# Patient Record
Sex: FEMALE | Race: WHITE | NOT HISPANIC OR LATINO | Employment: OTHER | URBAN - METROPOLITAN AREA
[De-identification: names, ages, dates, MRNs, and addresses within clinical notes are randomized per-mention and may not be internally consistent; named-entity substitution may affect disease eponyms.]

---

## 2019-03-18 ENCOUNTER — OFFICE VISIT (OUTPATIENT)
Dept: INTERNAL MEDICINE | Facility: CLINIC | Age: 69
End: 2019-03-18
Payer: MEDICARE

## 2019-03-18 VITALS
OXYGEN SATURATION: 96 % | HEART RATE: 66 BPM | WEIGHT: 168 LBS | HEIGHT: 68 IN | TEMPERATURE: 98 F | RESPIRATION RATE: 16 BRPM | DIASTOLIC BLOOD PRESSURE: 70 MMHG | SYSTOLIC BLOOD PRESSURE: 110 MMHG | BODY MASS INDEX: 25.46 KG/M2

## 2019-03-18 DIAGNOSIS — E11.65 UNCONTROLLED TYPE 2 DIABETES MELLITUS WITH HYPERGLYCEMIA: Primary | ICD-10-CM

## 2019-03-18 DIAGNOSIS — I82.432 ACUTE DEEP VEIN THROMBOSIS (DVT) OF POPLITEAL VEIN OF LEFT LOWER EXTREMITY: ICD-10-CM

## 2019-03-18 DIAGNOSIS — M81.0 AGE-RELATED OSTEOPOROSIS WITHOUT CURRENT PATHOLOGICAL FRACTURE: ICD-10-CM

## 2019-03-18 PROBLEM — I10 ESSENTIAL HYPERTENSION: Status: ACTIVE | Noted: 2019-03-18

## 2019-03-18 PROBLEM — I82.409 ACUTE DEEP VEIN THROMBOSIS (DVT): Status: ACTIVE | Noted: 2019-03-18

## 2019-03-18 PROBLEM — E21.3 HYPERPARATHYROIDISM: Status: ACTIVE | Noted: 2019-03-18

## 2019-03-18 PROCEDURE — 99214 PR OFFICE/OUTPT VISIT, EST, LEVL IV, 30-39 MIN: ICD-10-PCS | Mod: S$GLB,,, | Performed by: INTERNAL MEDICINE

## 2019-03-18 PROCEDURE — 99214 OFFICE O/P EST MOD 30 MIN: CPT | Mod: S$GLB,,, | Performed by: INTERNAL MEDICINE

## 2019-03-18 RX ORDER — ACETAMINOPHEN 500 MG
TABLET ORAL
COMMUNITY
End: 2019-04-29

## 2019-03-18 RX ORDER — APIXABAN 5 MG/1
5 TABLET, FILM COATED ORAL 2 TIMES DAILY
COMMUNITY
Start: 2019-03-11 | End: 2019-05-15 | Stop reason: SDUPTHER

## 2019-03-18 RX ORDER — GLIMEPIRIDE 2 MG/1
2 TABLET ORAL
COMMUNITY
Start: 2019-01-24 | End: 2020-02-28 | Stop reason: SDUPTHER

## 2019-03-18 RX ORDER — PRAVASTATIN SODIUM 40 MG/1
TABLET ORAL
COMMUNITY
End: 2019-08-05 | Stop reason: SDUPTHER

## 2019-03-18 RX ORDER — ASPIRIN 81 MG/1
TABLET ORAL
COMMUNITY

## 2019-03-18 RX ORDER — ALENDRONATE SODIUM 70 MG/1
TABLET ORAL
COMMUNITY
Start: 2019-02-05 | End: 2019-04-29 | Stop reason: SDUPTHER

## 2019-03-18 RX ORDER — VALACYCLOVIR HYDROCHLORIDE 500 MG/1
TABLET, FILM COATED ORAL
COMMUNITY
Start: 2018-12-31 | End: 2019-04-29

## 2019-03-18 RX ORDER — METFORMIN HYDROCHLORIDE 1000 MG/1
TABLET ORAL
COMMUNITY
End: 2020-01-07 | Stop reason: SDUPTHER

## 2019-03-18 NOTE — PROGRESS NOTES
Subjective:      Patient ID: Deepali Madrid is a 68 y.o. female.    Chief Complaint: Follow-up (DVT)    HPI Patient with h/o DM. Random blood sugar are not being checked at home. Patient last HbA1c was 7.6. patient denies polyuria, polydipsia, numbness in hands or feet. Patient reports compliance with medications. Patient is not adherent to diet. Patient BP are better controlled and is taking Lisnopril 10mg for reanl protection. . NO CP, SOB, edema.      Patient is being followed by Endocrinology for Hyper-parathyroidism. Patient also has Osteoporosis and is on Alendronate and Caltrate D.    Patient was found to have DVT Left popliteal vein inAug/2018 and was also had Factor V laden Mutation. Patient was taking Xarelto but caused gum bleed and so the medicine was changed to Eliquis and patient seems to be tolerating it well. Patient is tolerating the medicine well. No blood in stool or black colored stool. Patient recently had a repeat US that showed the DVT is still there.      Review of Systems   Constitutional: Negative for chills, diaphoresis, fever, malaise/fatigue and weight loss.   HENT: Negative for congestion, ear pain, sinus pain, sore throat and tinnitus.    Eyes: Negative for blurred vision and photophobia.   Respiratory: Negative for cough, hemoptysis, shortness of breath and wheezing.    Cardiovascular: Negative for chest pain, palpitations, orthopnea, leg swelling and PND.   Gastrointestinal: Negative for abdominal pain, blood in stool, constipation, diarrhea, heartburn, melena, nausea and vomiting.   Genitourinary: Negative for dysuria, frequency and urgency.   Musculoskeletal: Negative for back pain, myalgias and neck pain.   Skin: Negative for rash.   Neurological: Negative for dizziness, tremors, seizures, loss of consciousness and weakness.   Endo/Heme/Allergies: Negative for polydipsia.   Psychiatric/Behavioral: Negative for depression and hallucinations. The patient does not have insomnia.       Objective:     Physical Exam   Constitutional: She is oriented to person, place, and time. No distress.   Neck: No thyromegaly present.   Cardiovascular: Normal rate, regular rhythm and normal heart sounds.   No murmur heard.  Pulmonary/Chest: Effort normal and breath sounds normal. No respiratory distress. She has no wheezes.   Abdominal: Soft. Bowel sounds are normal. She exhibits no distension. There is no tenderness.   Musculoskeletal: She exhibits no edema.   Lymphadenopathy:     She has no cervical adenopathy.   Neurological: She is alert and oriented to person, place, and time.   Skin: She is not diaphoretic.   Psychiatric: She has a normal mood and affect. Her behavior is normal. Judgment and thought content normal.     Assessment:     1. Uncontrolled type 2 diabetes mellitus with hyperglycemia    2. Age-related osteoporosis without current pathological fracture    3. Acute deep vein thrombosis (DVT) of popliteal vein of left lower extremity       Plan:   Patient blood sugars seem under okay control.  Last HbA1c is 7.6  Started for patient age greater than 65 = 7-8  Will continue same dose of medication.  Patient is not adherent with  secondary to financial reasons.   Patient is on alendronate and Caltrate D. advised to continue  Patient has DVT in popliteal vein, factor V laden deficiency.  Patient did not tolerate Xarelto  Will continue Eliquis.   Will check labs on return.  Patient hyperparathyroidism is being followed by endocrinologist.

## 2019-04-29 ENCOUNTER — OFFICE VISIT (OUTPATIENT)
Dept: INTERNAL MEDICINE | Facility: CLINIC | Age: 69
End: 2019-04-29
Payer: MEDICARE

## 2019-04-29 VITALS
HEIGHT: 68 IN | DIASTOLIC BLOOD PRESSURE: 70 MMHG | RESPIRATION RATE: 16 BRPM | HEART RATE: 75 BPM | WEIGHT: 168 LBS | SYSTOLIC BLOOD PRESSURE: 112 MMHG | BODY MASS INDEX: 25.46 KG/M2 | OXYGEN SATURATION: 95 % | TEMPERATURE: 98 F

## 2019-04-29 DIAGNOSIS — Z79.4 TYPE 2 DIABETES MELLITUS WITH DIABETIC POLYNEUROPATHY, WITH LONG-TERM CURRENT USE OF INSULIN: Primary | ICD-10-CM

## 2019-04-29 DIAGNOSIS — E11.42 TYPE 2 DIABETES MELLITUS WITH DIABETIC POLYNEUROPATHY, WITH LONG-TERM CURRENT USE OF INSULIN: Primary | ICD-10-CM

## 2019-04-29 DIAGNOSIS — I82.432 ACUTE DEEP VEIN THROMBOSIS (DVT) OF POPLITEAL VEIN OF LEFT LOWER EXTREMITY: ICD-10-CM

## 2019-04-29 DIAGNOSIS — Z12.31 SCREENING MAMMOGRAM, ENCOUNTER FOR: ICD-10-CM

## 2019-04-29 DIAGNOSIS — L30.9 ECZEMA, UNSPECIFIED TYPE: ICD-10-CM

## 2019-04-29 LAB — GLUCOSE SERPL-MCNC: 103 MG/DL (ref 70–110)

## 2019-04-29 PROCEDURE — 99214 PR OFFICE/OUTPT VISIT, EST, LEVL IV, 30-39 MIN: ICD-10-PCS | Mod: 25,S$GLB,, | Performed by: INTERNAL MEDICINE

## 2019-04-29 PROCEDURE — 82962 GLUCOSE BLOOD TEST: CPT | Mod: ,,, | Performed by: INTERNAL MEDICINE

## 2019-04-29 PROCEDURE — 82962 POCT GLUCOSE, HAND-HELD DEVICE: ICD-10-PCS | Mod: ,,, | Performed by: INTERNAL MEDICINE

## 2019-04-29 PROCEDURE — 99214 OFFICE O/P EST MOD 30 MIN: CPT | Mod: 25,S$GLB,, | Performed by: INTERNAL MEDICINE

## 2019-04-29 RX ORDER — ALENDRONATE SODIUM 70 MG/1
70 TABLET ORAL
Qty: 4 TABLET | Refills: 3 | Status: SHIPPED | OUTPATIENT
Start: 2019-04-29 | End: 2019-08-12 | Stop reason: SDUPTHER

## 2019-04-29 RX ORDER — LISINOPRIL 10 MG/1
1 TABLET ORAL DAILY
COMMUNITY
Start: 2019-04-01 | End: 2019-07-31 | Stop reason: SDUPTHER

## 2019-04-29 RX ORDER — TRIAMCINOLONE ACETONIDE 1 MG/G
OINTMENT TOPICAL 2 TIMES DAILY
Qty: 15 G | Refills: 1 | Status: SHIPPED | OUTPATIENT
Start: 2019-04-29 | End: 2019-06-03

## 2019-04-29 NOTE — PROGRESS NOTES
Subjective:      Patient ID: Deepali Madrid is a 68 y.o. female.    Chief Complaint: Follow-up    HPI Patient with h/o DM. Random blood sugar are runnintg 120-140s. Patient last HbA1c was 7.6. patient denies polyuria, polydipsia, numbness in hands or feet. Patient reports compliance with medications. Patient is not adherent to diet. Patient BP are better controlled and is taking Lisnopril 10mg for reanl protection. . NO CP, SOB, edema.      Patient is being followed by Endocrinology for Hyper-parathyroidism. Patient also has Osteoporosis and is on Alendronate.     Patient was found to have DVT Left popliteal vein inAug/2018 and was also had Factor V laden Mutation. Patient was taking Xarelto but caused gum bleed and so the medicine was changed to Eliquis and patient seems to be tolerating it well. Patient reports No blood in stool or black colored stool. Patient recently had a repeat US that showed the DVT is still there.     Patient has rash on the abdomen x 6 months. The rash is itchy, with dry skin, no blisters, no oozing from the lesions, no fever or chills. Patient thinks the rash started after she received Shingrix vaccine. Patient is applying hydrocortisone cream on it with some help.       Review of Systems   Constitutional: Negative for chills, diaphoresis, fever, malaise/fatigue and weight loss.   HENT: Negative for congestion, ear pain, sinus pain, sore throat and tinnitus.    Eyes: Negative for blurred vision and photophobia.   Respiratory: Negative for cough, hemoptysis, shortness of breath and wheezing.    Cardiovascular: Negative for chest pain, palpitations, orthopnea, leg swelling and PND.   Gastrointestinal: Negative for abdominal pain, blood in stool, constipation, diarrhea, heartburn, melena, nausea and vomiting.   Genitourinary: Negative for dysuria, frequency and urgency.   Musculoskeletal: Negative for back pain, myalgias and neck pain.   Skin: Positive for rash.   Neurological: Negative for  dizziness, tremors, seizures, loss of consciousness and weakness.   Endo/Heme/Allergies: Negative for polydipsia.   Psychiatric/Behavioral: Negative for depression and hallucinations. The patient does not have insomnia.      Objective:     Physical Exam   Constitutional: She is oriented to person, place, and time. No distress.   Neck: No thyromegaly present.   Cardiovascular: Normal rate, regular rhythm and normal heart sounds.   No murmur heard.  Pulmonary/Chest: Effort normal and breath sounds normal. No respiratory distress. She has no wheezes.   Abdominal: Soft. Bowel sounds are normal. She exhibits no distension. There is no tenderness.   Musculoskeletal: She exhibits no edema.   Lymphadenopathy:     She has no cervical adenopathy.   Neurological: She is alert and oriented to person, place, and time.   Skin: She is not diaphoretic.   Four patches of skin with roughened skin, with mild hyperpigmentation, scaling, there are numerous erythematous papules but no vesicles noted.    Psychiatric: She has a normal mood and affect. Her behavior is normal. Judgment and thought content normal.     Assessment:     1. Type 2 diabetes mellitus with diabetic polyneuropathy, with long-term current use of insulin    2. Screening mammogram, encounter for    3. Eczema, unspecified type    4. Acute deep vein thrombosis (DVT) of popliteal vein of left lower extremity       Plan:   Patient blood sugars seem under okay control.  Last HbA1c is 7.6  Will continue same dose of medication.  Patient is not adherent with  secondary to financial reasons.   Patient is on alendronate advised to continue  Patient has DVT in popliteal vein, factor V laden deficiency.  Patient did not tolerate Xarelto  Will continue Eliquis.   Patient has eczematous rash on the abdomen.  Will use triamcinolone cream.  Will order for screening mammogram

## 2019-05-15 RX ORDER — APIXABAN 5 MG/1
TABLET, FILM COATED ORAL
Qty: 60 TABLET | Refills: 11 | Status: SHIPPED | OUTPATIENT
Start: 2019-05-15 | End: 2020-11-02 | Stop reason: SDUPTHER

## 2019-06-03 ENCOUNTER — OFFICE VISIT (OUTPATIENT)
Dept: INTERNAL MEDICINE | Facility: CLINIC | Age: 69
End: 2019-06-03
Payer: MEDICARE

## 2019-06-03 VITALS
HEART RATE: 76 BPM | TEMPERATURE: 99 F | SYSTOLIC BLOOD PRESSURE: 105 MMHG | BODY MASS INDEX: 25.01 KG/M2 | WEIGHT: 165 LBS | DIASTOLIC BLOOD PRESSURE: 72 MMHG | RESPIRATION RATE: 16 BRPM | OXYGEN SATURATION: 95 % | HEIGHT: 68 IN

## 2019-06-03 DIAGNOSIS — Z79.4 TYPE 2 DIABETES MELLITUS WITH DIABETIC POLYNEUROPATHY, WITH LONG-TERM CURRENT USE OF INSULIN: Primary | ICD-10-CM

## 2019-06-03 DIAGNOSIS — Z12.11 ENCOUNTER FOR SCREENING COLONOSCOPY: ICD-10-CM

## 2019-06-03 DIAGNOSIS — R21 RASH: ICD-10-CM

## 2019-06-03 DIAGNOSIS — E11.42 TYPE 2 DIABETES MELLITUS WITH DIABETIC POLYNEUROPATHY, WITH LONG-TERM CURRENT USE OF INSULIN: Primary | ICD-10-CM

## 2019-06-03 PROCEDURE — 99213 PR OFFICE/OUTPT VISIT, EST, LEVL III, 20-29 MIN: ICD-10-PCS | Mod: S$GLB,,, | Performed by: INTERNAL MEDICINE

## 2019-06-03 PROCEDURE — 99213 OFFICE O/P EST LOW 20 MIN: CPT | Mod: S$GLB,,, | Performed by: INTERNAL MEDICINE

## 2019-06-03 RX ORDER — TRIAMCINOLONE ACETONIDE 5 MG/G
OINTMENT TOPICAL 2 TIMES DAILY
Qty: 15 G | Refills: 1 | Status: SHIPPED | OUTPATIENT
Start: 2019-06-03 | End: 2019-07-05 | Stop reason: SDUPTHER

## 2019-06-03 NOTE — PROGRESS NOTES
Subjective:      Patient ID: Deepali Madrid is a 68 y.o. female.    Chief Complaint: Rash    HPI     Patient with h/o DM. Random blood sugar are runnintg 120-140s. Patient last HbA1c was 6.5. Patient denies polyuria, polydipsia, numbness in hands or feet. Patient reports compliance with medications. Patient is not adherent to diet. Patient BP are better controlled and is taking Lisnopril 10mg for reanl protection. . NO CP, SOB, edema.    Patient is being followed by Endocrinology for Hyper-parathyroidism. Patient also has Osteoporosis and is on Alendronate.     Patient was found to have DVT Left popliteal vein in Aug/2018 and was also had Factor V laden Mutation. Patient was taking Xarelto but caused gum bleed and so the medicine was changed to Eliquis and patient seems to be tolerating it well. Patient reports No blood in stool or black colored stool. Patient recently had a repeat US that showed the DVT is still there.     Patient has rash on the abdomen x 6 months. The rash is itchy, with dry skin, no blisters, no oozing from the lesions, no fever or chills. Patient thinks the rash started after she received Shingrix vaccine. Patient is applying hydrocortisone cream on it with some help.  Patient reports the rash gets better but to come back again.      Review of Systems   Constitutional: Positive for weight loss (3 lbs (intentional)). Negative for chills, diaphoresis, fever and malaise/fatigue.   HENT: Negative for congestion, ear pain, sinus pain, sore throat and tinnitus.    Eyes: Negative for blurred vision and photophobia.   Respiratory: Negative for cough, hemoptysis, shortness of breath and wheezing.    Cardiovascular: Negative for chest pain, palpitations, orthopnea, leg swelling and PND.   Gastrointestinal: Negative for abdominal pain, blood in stool, constipation, diarrhea, heartburn, melena, nausea and vomiting.   Genitourinary: Negative for dysuria, frequency and urgency.   Musculoskeletal: Negative for  back pain, myalgias and neck pain.   Skin: Positive for rash.   Neurological: Negative for dizziness, tremors, seizures, loss of consciousness and weakness.   Endo/Heme/Allergies: Negative for polydipsia.   Psychiatric/Behavioral: Negative for depression and hallucinations. The patient does not have insomnia.      Objective:     Physical Exam   Constitutional: She is oriented to person, place, and time. No distress.   Neck: No thyromegaly present.   Cardiovascular: Normal rate, regular rhythm and normal heart sounds.   No murmur heard.  Pulmonary/Chest: Effort normal and breath sounds normal. No respiratory distress. She has no wheezes.   Abdominal: Soft. Bowel sounds are normal. She exhibits no distension. There is no tenderness.   Musculoskeletal: She exhibits no edema.   Lymphadenopathy:     She has no cervical adenopathy.   Neurological: She is alert and oriented to person, place, and time.   Skin: She is not diaphoretic.   There are four patches of skin with with multiple patches clustered together. No pustules noted. These are erythematous and with clear background.    Psychiatric: She has a normal mood and affect. Her behavior is normal. Judgment and thought content normal.     Assessment:     1. Type 2 diabetes mellitus with diabetic polyneuropathy, with long-term current use of insulin    2. Encounter for screening colonoscopy    3. Rash       Plan:   Patient blood sugars seem under okay control.  Last HbA1c is 6.5  Will continue same dose of medication.  Patient is not adherent with diet secondary to financial reasons.   Patient is on alendronate advised to continue  Patient has DVT in popliteal vein, factor V laden deficiency.  Patient did not tolerate Xarelto  Will continue Eliquis.   Patient has  rash on the abdomen that improves with triamcinolone.  Will increase the strength of cream  Will refer to dermatologist

## 2019-06-25 LAB — HEMOCCULT STL QL IA: POSITIVE

## 2019-07-05 DIAGNOSIS — R21 RASH: ICD-10-CM

## 2019-07-05 RX ORDER — TRIAMCINOLONE ACETONIDE 5 MG/G
OINTMENT TOPICAL 2 TIMES DAILY
Qty: 15 G | Refills: 1 | Status: SHIPPED | OUTPATIENT
Start: 2019-07-05 | End: 2020-11-02

## 2019-07-15 ENCOUNTER — OFFICE VISIT (OUTPATIENT)
Dept: INTERNAL MEDICINE | Facility: CLINIC | Age: 69
End: 2019-07-15
Payer: MEDICARE

## 2019-07-15 VITALS
WEIGHT: 173 LBS | BODY MASS INDEX: 26.22 KG/M2 | TEMPERATURE: 98 F | RESPIRATION RATE: 16 BRPM | SYSTOLIC BLOOD PRESSURE: 127 MMHG | OXYGEN SATURATION: 98 % | DIASTOLIC BLOOD PRESSURE: 80 MMHG | HEIGHT: 68 IN | HEART RATE: 60 BPM

## 2019-07-15 DIAGNOSIS — Z79.4 TYPE 2 DIABETES MELLITUS WITH DIABETIC POLYNEUROPATHY, WITH LONG-TERM CURRENT USE OF INSULIN: Primary | ICD-10-CM

## 2019-07-15 DIAGNOSIS — I82.432 ACUTE DEEP VEIN THROMBOSIS (DVT) OF POPLITEAL VEIN OF LEFT LOWER EXTREMITY: ICD-10-CM

## 2019-07-15 DIAGNOSIS — E11.42 TYPE 2 DIABETES MELLITUS WITH DIABETIC POLYNEUROPATHY, WITH LONG-TERM CURRENT USE OF INSULIN: Primary | ICD-10-CM

## 2019-07-15 DIAGNOSIS — Z12.11 ENCOUNTER FOR SCREENING COLONOSCOPY: ICD-10-CM

## 2019-07-15 PROCEDURE — 99214 PR OFFICE/OUTPT VISIT, EST, LEVL IV, 30-39 MIN: ICD-10-PCS | Mod: S$GLB,,, | Performed by: INTERNAL MEDICINE

## 2019-07-15 PROCEDURE — 99214 OFFICE O/P EST MOD 30 MIN: CPT | Mod: S$GLB,,, | Performed by: INTERNAL MEDICINE

## 2019-07-15 NOTE — PROGRESS NOTES
Subjective:      Patient ID: Deepali Madrid is a 68 y.o. female.    Chief Complaint: Follow-up    HPI   Patient with h/o DM. Random blood sugar are runnintg 120-140s. Patient last HbA1c was 6.5. Patient denies polyuria, polydipsia, numbness in hands or feet. Patient reports compliance with medications. Patient is not adherent to diet. Patient BP are better controlled and is taking Lisnopril 10mg for renal protection. . NO CP, SOB, edema.    Patient is being followed by Endocrinology for Hyper-parathyroidism. Patient also has Osteoporosis and is on Alendronate.     Patient was found to have DVT Left popliteal vein in Aug/2018 and was also had Factor V laden Mutation. Patient was taking Xarelto but caused gum bleed and so the medicine was changed to Eliquis and patient seems to be tolerating it well. Patient reports No blood in stool or black colored stool. Patient recently had a repeat US that showed the DVT is still there.     Patient has rash on the abdomen x 6 months. The rash is itchy, with dry skin, no blisters, no oozing from the lesions, no fever or chills. Patient was evaluated by dermatologist and [facility] suggested Fungal infection. Patient is given Anti-fungal medications and that seem to have improved the symptoms.     Patient Colguard test was positive and patient is counseled in detail about need to have colonoscopy.      Review of Systems   Constitutional: Negative for chills, diaphoresis, fever, malaise/fatigue and weight loss (weight gain 6 lbs).   HENT: Negative for congestion, ear pain, sinus pain, sore throat and tinnitus.    Eyes: Negative for blurred vision and photophobia.   Respiratory: Negative for cough, hemoptysis, shortness of breath and wheezing.    Cardiovascular: Negative for chest pain, palpitations, orthopnea, leg swelling and PND.   Gastrointestinal: Negative for abdominal pain, blood in stool, constipation, diarrhea, heartburn, melena, nausea and vomiting.   Genitourinary: Negative for  dysuria, frequency and urgency.   Musculoskeletal: Negative for back pain, myalgias and neck pain.   Skin: Positive for rash.   Neurological: Negative for dizziness, tremors, seizures, loss of consciousness and weakness.   Endo/Heme/Allergies: Negative for polydipsia.   Psychiatric/Behavioral: Negative for depression and hallucinations. The patient does not have insomnia.      Objective:     Physical Exam   Constitutional: She is oriented to person, place, and time. No distress.   Neck: No thyromegaly present.   Cardiovascular: Normal rate, regular rhythm and normal heart sounds.   No murmur heard.  Pulmonary/Chest: Effort normal and breath sounds normal. No respiratory distress. She has no wheezes.   Abdominal: Soft. Bowel sounds are normal. She exhibits no distension. There is no tenderness.   Musculoskeletal: She exhibits no edema.   Lymphadenopathy:     She has no cervical adenopathy.   Neurological: She is alert and oriented to person, place, and time.   Skin: She is not diaphoretic.   Multiple patches of skin lesion noted before are resolving and there is mild discoloration left. No vesicles, blisters noted.     Psychiatric: She has a normal mood and affect. Her behavior is normal. Judgment and thought content normal.     Assessment:     1. Type 2 diabetes mellitus with diabetic polyneuropathy, with long-term current use of insulin    2. Encounter for screening colonoscopy    3. Acute deep vein thrombosis (DVT) of popliteal vein of left lower extremity       Plan:   Patient blood sugars seem under okay control.  Last HbA1c is 6.5  Will continue same dose of medication.  Patient is not adherent with diet secondary to financial reasons.   Patient has DVT in popliteal vein, factor V laden deficiency.  Patient did not tolerate Xarelto  Will continue Eliquis.   Patient has color guard which is positive.  Consult patient in detail and try to convince her to get colonoscopy  Patient agreed to make appointment for  colonoscopy.  Will refer

## 2019-07-31 RX ORDER — LISINOPRIL 10 MG/1
10 TABLET ORAL DAILY
Qty: 30 TABLET | Refills: 0 | Status: SHIPPED | OUTPATIENT
Start: 2019-07-31 | End: 2020-04-17 | Stop reason: SDUPTHER

## 2019-08-05 DIAGNOSIS — E11.42 TYPE 2 DIABETES MELLITUS WITH DIABETIC POLYNEUROPATHY, WITH LONG-TERM CURRENT USE OF INSULIN: Primary | ICD-10-CM

## 2019-08-05 DIAGNOSIS — Z79.4 TYPE 2 DIABETES MELLITUS WITH DIABETIC POLYNEUROPATHY, WITH LONG-TERM CURRENT USE OF INSULIN: Primary | ICD-10-CM

## 2019-08-06 RX ORDER — PRAVASTATIN SODIUM 40 MG/1
40 TABLET ORAL DAILY
Qty: 90 TABLET | Refills: 0 | Status: SHIPPED | OUTPATIENT
Start: 2019-08-06 | End: 2019-11-21 | Stop reason: SDUPTHER

## 2019-08-13 RX ORDER — ALENDRONATE SODIUM 70 MG/1
TABLET ORAL
Qty: 4 TABLET | Refills: 2 | Status: SHIPPED | OUTPATIENT
Start: 2019-08-13 | End: 2019-08-30 | Stop reason: SDUPTHER

## 2019-08-28 LAB
ANION GAP SERPL CALC-SCNC: 10 MMOL/L (ref 8–17)
BUN/CREAT SERPL: 16.9 (ref 6–20)
CALCIUM SERPL-MCNC: 11.4 MG/DL (ref 8.6–10.2)
CARBON DIOXIDE, CO2: 27 MMOL/L (ref 22–29)
CHLORIDE: 105 MMOL/L (ref 98–107)
CREAT SERPL-MCNC: 0.7 MG/DL (ref 0.5–0.9)
ESTIMATED AVERAGE GLUCOSE: 147 MG/DL
GFR ESTIMATION: 83.21
GLUCOSE: 162 MG/DL (ref 82–115)
HBA1C MFR BLD: 6.8 % (ref 4–6)
POTASSIUM: 4.7 MMOL/L (ref 3.5–5.1)
SODIUM: 142 MMOL/L (ref 136–145)
UREA NITROGEN (BUN): 11.8 MG/DL (ref 8–23)

## 2019-08-30 RX ORDER — ALENDRONATE SODIUM 70 MG/1
70 TABLET ORAL
Qty: 4 TABLET | Refills: 3 | Status: SHIPPED | OUTPATIENT
Start: 2019-08-30 | End: 2020-11-02 | Stop reason: SDUPTHER

## 2019-09-03 ENCOUNTER — OFFICE VISIT (OUTPATIENT)
Dept: INTERNAL MEDICINE | Facility: CLINIC | Age: 69
End: 2019-09-03
Payer: MEDICARE

## 2019-09-03 VITALS
HEIGHT: 68 IN | TEMPERATURE: 98 F | OXYGEN SATURATION: 97 % | HEART RATE: 71 BPM | DIASTOLIC BLOOD PRESSURE: 75 MMHG | BODY MASS INDEX: 25.61 KG/M2 | SYSTOLIC BLOOD PRESSURE: 119 MMHG | RESPIRATION RATE: 16 BRPM | WEIGHT: 169 LBS

## 2019-09-03 DIAGNOSIS — Z12.11 ENCOUNTER FOR SCREENING COLONOSCOPY: ICD-10-CM

## 2019-09-03 DIAGNOSIS — E11.42 TYPE 2 DIABETES MELLITUS WITH DIABETIC POLYNEUROPATHY, WITH LONG-TERM CURRENT USE OF INSULIN: Primary | ICD-10-CM

## 2019-09-03 DIAGNOSIS — Z79.4 TYPE 2 DIABETES MELLITUS WITH DIABETIC POLYNEUROPATHY, WITH LONG-TERM CURRENT USE OF INSULIN: Primary | ICD-10-CM

## 2019-09-03 DIAGNOSIS — E21.3 HYPERPARATHYROIDISM: ICD-10-CM

## 2019-09-03 PROCEDURE — 3044F HG A1C LEVEL LT 7.0%: CPT | Mod: CPTII,S$GLB,, | Performed by: INTERNAL MEDICINE

## 2019-09-03 PROCEDURE — 99214 OFFICE O/P EST MOD 30 MIN: CPT | Mod: S$GLB,,, | Performed by: INTERNAL MEDICINE

## 2019-09-03 PROCEDURE — 99214 PR OFFICE/OUTPT VISIT, EST, LEVL IV, 30-39 MIN: ICD-10-PCS | Mod: S$GLB,,, | Performed by: INTERNAL MEDICINE

## 2019-09-03 PROCEDURE — 3044F PR MOST RECENT HEMOGLOBIN A1C LEVEL <7.0%: ICD-10-PCS | Mod: CPTII,S$GLB,, | Performed by: INTERNAL MEDICINE

## 2019-09-03 NOTE — PROGRESS NOTES
Subjective:      Patient ID: Deepali Madrid is a 68 y.o. female.    Chief Complaint: Follow-up    HPI:     Patient with h/o DM. Random blood sugar are runnintg 120-140s. Patient last HbA1c was 6.8. Patient denies polyuria, polydipsia, numbness in hands or feet. Patient reports compliance with medications. Patient is not adherent to diet. Patient BP are better controlled and is taking Lisnopril 10mg for renal protection. . NO CP, SOB, edema.    Patient is being followed by Endocrinology for Hyper-parathyroidism. Patient also has Osteoporosis and is on Alendronate.     Patient was found to have DVT Left popliteal vein in Aug/2018 and was also had Factor V laden Mutation. Patient was taking Xarelto but caused gum bleed and so the medicine was changed to Eliquis and patient seems to be tolerating it well. Patient reports No blood in stool or black colored stool. Patient recently had a repeat US that showed the DVT is still there.     Patient Colguard test was positive and patient is counseled in detail about need to have colonoscopy. Patient agreed to get the study scheduled today.      Review of Systems   Constitutional: Negative for chills, diaphoresis, fever, malaise/fatigue and weight loss (weight loss 4lbs).   HENT: Negative for congestion, ear pain, sinus pain, sore throat and tinnitus.    Eyes: Negative for blurred vision and photophobia.   Respiratory: Negative for cough, hemoptysis, shortness of breath and wheezing.    Cardiovascular: Negative for chest pain, palpitations, orthopnea, leg swelling and PND.   Gastrointestinal: Negative for abdominal pain, blood in stool, constipation, diarrhea, heartburn, melena, nausea and vomiting.   Genitourinary: Negative for dysuria, frequency and urgency.   Musculoskeletal: Negative for back pain, myalgias and neck pain.   Skin: Negative for rash.   Neurological: Negative for dizziness, tremors, seizures, loss of consciousness and weakness.   Endo/Heme/Allergies: Negative for  polydipsia.   Psychiatric/Behavioral: Negative for depression and hallucinations. The patient does not have insomnia.      Objective:     Physical Exam   Constitutional: She is oriented to person, place, and time. No distress.   Neck: No thyromegaly present.   Cardiovascular: Normal rate, regular rhythm and normal heart sounds.   No murmur heard.  Pulmonary/Chest: Effort normal and breath sounds normal. No respiratory distress. She has no wheezes.   Abdominal: Soft. Bowel sounds are normal. She exhibits no distension. There is no tenderness.   Musculoskeletal: She exhibits no edema.   Lymphadenopathy:     She has no cervical adenopathy.   Neurological: She is alert and oriented to person, place, and time.   Skin: She is not diaphoretic.   Psychiatric: She has a normal mood and affect. Her behavior is normal. Judgment and thought content normal.     Assessment:     1. Type 2 diabetes mellitus with diabetic polyneuropathy, with long-term current use of insulin    2. Encounter for screening colonoscopy    3. Hyperparathyroidism       Plan:   Patient blood sugars seem under okay control.  Last HbA1c is 6.8.  Will check CMP and A1c on return.  Will continue same dose of medication.  Patient is not adherent with diet secondary to financial reasons.   Patient has DVT in popliteal vein, factor V laden deficiency.  Patient is tolerating Eliquis.  Will continue medication   Patient has colo-guard which is positive.  Patient agreed to get colonoscopy.  Will refer to GI  Patient has hyperparathyroidism but no symptom of the disease. Will continue to monitor  Patient is being followed by endocrinologist as well

## 2019-11-20 ENCOUNTER — OFFICE VISIT (OUTPATIENT)
Dept: INTERNAL MEDICINE | Facility: CLINIC | Age: 69
End: 2019-11-20
Payer: MEDICARE

## 2019-11-20 VITALS
TEMPERATURE: 98 F | SYSTOLIC BLOOD PRESSURE: 104 MMHG | OXYGEN SATURATION: 98 % | HEART RATE: 72 BPM | DIASTOLIC BLOOD PRESSURE: 74 MMHG | BODY MASS INDEX: 25.7 KG/M2 | HEIGHT: 68 IN

## 2019-11-20 DIAGNOSIS — M54.32 SCIATICA OF LEFT SIDE: Primary | ICD-10-CM

## 2019-11-20 PROCEDURE — 99213 OFFICE O/P EST LOW 20 MIN: CPT | Mod: 25,S$GLB,, | Performed by: NURSE PRACTITIONER

## 2019-11-20 PROCEDURE — 1159F MED LIST DOCD IN RCRD: CPT | Mod: S$GLB,,, | Performed by: NURSE PRACTITIONER

## 2019-11-20 PROCEDURE — 96372 THER/PROPH/DIAG INJ SC/IM: CPT | Mod: S$GLB,,, | Performed by: NURSE PRACTITIONER

## 2019-11-20 PROCEDURE — 1159F PR MEDICATION LIST DOCUMENTED IN MEDICAL RECORD: ICD-10-PCS | Mod: S$GLB,,, | Performed by: NURSE PRACTITIONER

## 2019-11-20 PROCEDURE — 99213 PR OFFICE/OUTPT VISIT, EST, LEVL III, 20-29 MIN: ICD-10-PCS | Mod: 25,S$GLB,, | Performed by: NURSE PRACTITIONER

## 2019-11-20 PROCEDURE — 96372 PR INJECTION,THERAP/PROPH/DIAG2ST, IM OR SUBCUT: ICD-10-PCS | Mod: S$GLB,,, | Performed by: NURSE PRACTITIONER

## 2019-11-20 RX ORDER — KETOROLAC TROMETHAMINE 30 MG/ML
30 INJECTION, SOLUTION INTRAMUSCULAR; INTRAVENOUS ONCE
Status: COMPLETED | OUTPATIENT
Start: 2019-11-20 | End: 2019-11-20

## 2019-11-20 RX ORDER — GLIMEPIRIDE 2 MG/1
TABLET ORAL
Qty: 90 TABLET | Refills: 3 | Status: SHIPPED | OUTPATIENT
Start: 2019-11-20 | End: 2020-05-06 | Stop reason: SDUPTHER

## 2019-11-20 RX ORDER — DICLOFENAC SODIUM 75 MG/1
75 TABLET, DELAYED RELEASE ORAL 2 TIMES DAILY PRN
Qty: 14 TABLET | Refills: 0 | Status: SHIPPED | OUTPATIENT
Start: 2019-11-20 | End: 2019-11-27

## 2019-11-20 RX ORDER — HYDROCODONE BITARTRATE AND ACETAMINOPHEN 7.5; 325 MG/1; MG/1
1 TABLET ORAL EVERY 6 HOURS PRN
Qty: 28 TABLET | Refills: 0 | Status: SHIPPED | OUTPATIENT
Start: 2019-11-20 | End: 2019-11-27

## 2019-11-20 RX ADMIN — KETOROLAC TROMETHAMINE 30 MG: 30 INJECTION, SOLUTION INTRAMUSCULAR; INTRAVENOUS at 02:11

## 2019-11-20 NOTE — PROGRESS NOTES
Subjective:      Patient ID: Deepali Madrid is a 69 y.o. female.    Chief Complaint: No chief complaint on file.    HPI:     Patient with h/o DM. Random blood sugar are runnintg 120-140s. Patient last HbA1c was 6.8. Patient denies polyuria, polydipsia, numbness in hands or feet. Patient reports compliance with medications. Patient is not adherent to diet. Patient BP are better controlled and is taking Lisnopril 10mg for renal protection. . NO CP, SOB, edema.    Patient is being followed by Endocrinology for Hyper-parathyroidism. Patient also has Osteoporosis and is on Alendronate.     Patient was found to have DVT Left popliteal vein in Aug/2018 and was also had Factor V laden Mutation. Patient was taking Xarelto but caused gum bleed and so the medicine was changed to Eliquis and patient seems to be tolerating it well. Patient reports No blood in stool or black colored stool. Patient recently had a repeat US that showed the DVT is still there.     Patient Colguard test was positive and patient is counseled in detail about need to have colonoscopy. Patient agreed to get the study scheduled today.      Onset Saturday- with low back pain radiating down left buttock down into left calf- 10/10, worsens with leg extension and standing-no known injury- states previously occurred 17 years ago, no loss of usama     Review of Systems   Constitutional: Negative for chills, diaphoresis, fever, malaise/fatigue and weight loss.   HENT: Negative for congestion, ear pain, sinus pain, sore throat and tinnitus.    Eyes: Negative for blurred vision and photophobia.   Respiratory: Negative for cough, hemoptysis, shortness of breath and wheezing.    Cardiovascular: Negative for chest pain, palpitations, orthopnea, leg swelling and PND.   Gastrointestinal: Negative for abdominal pain, blood in stool, constipation, diarrhea, heartburn, melena, nausea and vomiting.   Genitourinary: Negative for dysuria, frequency and urgency.    Musculoskeletal: Positive for back pain (low back pain radiating down left buttock and leg). Negative for myalgias and neck pain.   Skin: Negative for rash.   Neurological: Negative for dizziness, tremors, seizures, loss of consciousness and weakness.   Endo/Heme/Allergies: Negative for polydipsia.   Psychiatric/Behavioral: Negative for depression and hallucinations. The patient does not have insomnia.      Objective:     Physical Exam   Constitutional: She is oriented to person, place, and time. No distress.   HENT:   Head: Normocephalic.   Eyes: Right eye exhibits no discharge. Left eye exhibits no discharge.   Cardiovascular: Normal rate, regular rhythm and normal heart sounds.   No murmur heard.  Pulmonary/Chest: Effort normal and breath sounds normal. No respiratory distress. She has no wheezes. She has no rales.   Musculoskeletal: She exhibits tenderness (L spine). She exhibits no edema.   Pt in wheel chair due to burning pain with standing, pt guards against extending leg due to pain at this time, is walking with walker in her home     Neurological: She is alert and oriented to person, place, and time.   Skin: Skin is warm and dry. She is not diaphoretic.   Psychiatric: She has a normal mood and affect. Her behavior is normal. Judgment and thought content normal.     Assessment:     No diagnosis found.   Plan:     1. Sciatica of left side  HYDROcodone-acetaminophen (NORCO) 7.5-325 mg per tablet    ketorolac injection 30 mg    diclofenac (VOLTAREN) 75 MG EC tablet    Ambulatory referral/consult to Orthopedics

## 2019-11-21 DIAGNOSIS — E11.42 TYPE 2 DIABETES MELLITUS WITH DIABETIC POLYNEUROPATHY, WITH LONG-TERM CURRENT USE OF INSULIN: ICD-10-CM

## 2019-11-21 DIAGNOSIS — Z79.4 TYPE 2 DIABETES MELLITUS WITH DIABETIC POLYNEUROPATHY, WITH LONG-TERM CURRENT USE OF INSULIN: ICD-10-CM

## 2019-11-21 RX ORDER — PRAVASTATIN SODIUM 40 MG/1
40 TABLET ORAL DAILY
Qty: 90 TABLET | Refills: 0 | Status: SHIPPED | OUTPATIENT
Start: 2019-11-21 | End: 2020-02-21 | Stop reason: SDUPTHER

## 2019-11-21 NOTE — TELEPHONE ENCOUNTER
----- Message from Essie Collazo sent at 11/21/2019 11:35 AM CST -----   Patient needs a refill of Pravastatin to [facility] pharmacy.  She is having problems with [facility] and doesn't want to use them anymore.

## 2019-11-25 ENCOUNTER — TELEPHONE (OUTPATIENT)
Dept: INTERNAL MEDICINE | Facility: CLINIC | Age: 69
End: 2019-11-25

## 2019-11-25 NOTE — TELEPHONE ENCOUNTER
----- Message from Tasha Shaver NP sent at 11/25/2019 12:54 PM CST -----  Dr steele will see her tomorrow, his office notified her of appt time  ----- Message -----  From: Essie Collazo  Sent: 11/25/2019  11:59 AM CST  To: Tasha Shaver NP    Patient still having severe pain in leg.  States that medication is not helping and shot only helped for a very short while.  I will call and follow up on referral that was sent to Dr. Steele after lunch.

## 2020-01-07 RX ORDER — METFORMIN HYDROCHLORIDE 1000 MG/1
TABLET ORAL
Qty: 60 TABLET | Refills: 2 | Status: SHIPPED | OUTPATIENT
Start: 2020-01-07 | End: 2020-01-07 | Stop reason: SDUPTHER

## 2020-01-07 RX ORDER — METFORMIN HYDROCHLORIDE 1000 MG/1
TABLET ORAL
Qty: 60 TABLET | Refills: 2 | Status: SHIPPED | OUTPATIENT
Start: 2020-01-07 | End: 2020-05-06 | Stop reason: SDUPTHER

## 2020-01-07 NOTE — TELEPHONE ENCOUNTER
----- Message from Essie Collazo sent at 1/7/2020  9:45 AM CST -----   Patient needs a refill of Metformin to [facility].

## 2020-02-04 LAB
ALBUMIN SERPL-MCNC: 4.4 G/DL (ref 3.5–5.2)
ALBUMIN/GLOB SERPL ELPH: 1.4 {RATIO} (ref 1–2.7)
ALP ISOS SERPL LEV INH-CCNC: 62 U/L (ref 35–105)
ALT (SGPT): 18 U/L (ref 0–33)
ANION GAP SERPL CALC-SCNC: 11 MMOL/L (ref 8–17)
AST SERPL-CCNC: 28 U/L (ref 0–32)
BILIRUBIN, TOTAL: 0.51 MG/DL (ref 0–1.2)
BUN/CREAT SERPL: 14.5 (ref 6–20)
CALCIUM SERPL-MCNC: 11.4 MG/DL (ref 8.6–10.2)
CARBON DIOXIDE, CO2: 26 MMOL/L (ref 22–29)
CHLORIDE: 106 MMOL/L (ref 98–107)
CREAT SERPL-MCNC: 0.67 MG/DL (ref 0.5–0.9)
ESTIMATED AVERAGE GLUCOSE: 149 MG/DL
GFR ESTIMATION: 87.27
GLOBULIN: 3.1 G/DL (ref 1.5–4.5)
GLUCOSE: 97 MG/DL (ref 82–115)
HBA1C MFR BLD: 6.8 % (ref 4–6)
PHOSPHATE FLD-MCNC: 2.1 MG/DL (ref 2.5–4.5)
POTASSIUM: 5 MMOL/L (ref 3.5–5.1)
PROT SNV-MCNC: 7.5 G/DL (ref 6.4–8.3)
SODIUM: 143 MMOL/L (ref 136–145)
UREA NITROGEN (BUN): 9.7 MG/DL (ref 8–23)

## 2020-02-05 DIAGNOSIS — E83.39 HYPOPHOSPHATEMIA: Primary | ICD-10-CM

## 2020-02-10 ENCOUNTER — OFFICE VISIT (OUTPATIENT)
Dept: INTERNAL MEDICINE | Facility: CLINIC | Age: 70
End: 2020-02-10
Payer: MEDICARE

## 2020-02-10 VITALS
WEIGHT: 168 LBS | DIASTOLIC BLOOD PRESSURE: 71 MMHG | BODY MASS INDEX: 25.46 KG/M2 | OXYGEN SATURATION: 95 % | TEMPERATURE: 97 F | HEIGHT: 68 IN | HEART RATE: 67 BPM | SYSTOLIC BLOOD PRESSURE: 116 MMHG

## 2020-02-10 DIAGNOSIS — I82.432 ACUTE DEEP VEIN THROMBOSIS (DVT) OF POPLITEAL VEIN OF LEFT LOWER EXTREMITY: ICD-10-CM

## 2020-02-10 DIAGNOSIS — E11.42 TYPE 2 DIABETES MELLITUS WITH DIABETIC POLYNEUROPATHY, WITH LONG-TERM CURRENT USE OF INSULIN: Primary | ICD-10-CM

## 2020-02-10 DIAGNOSIS — Z79.4 TYPE 2 DIABETES MELLITUS WITH DIABETIC POLYNEUROPATHY, WITH LONG-TERM CURRENT USE OF INSULIN: Primary | ICD-10-CM

## 2020-02-10 DIAGNOSIS — E21.3 HYPERPARATHYROIDISM: ICD-10-CM

## 2020-02-10 PROCEDURE — 99214 PR OFFICE/OUTPT VISIT, EST, LEVL IV, 30-39 MIN: ICD-10-PCS | Mod: S$GLB,,, | Performed by: INTERNAL MEDICINE

## 2020-02-10 PROCEDURE — 1159F MED LIST DOCD IN RCRD: CPT | Mod: S$GLB,,, | Performed by: INTERNAL MEDICINE

## 2020-02-10 PROCEDURE — 3044F PR MOST RECENT HEMOGLOBIN A1C LEVEL <7.0%: ICD-10-PCS | Mod: CPTII,S$GLB,, | Performed by: INTERNAL MEDICINE

## 2020-02-10 PROCEDURE — 99214 OFFICE O/P EST MOD 30 MIN: CPT | Mod: S$GLB,,, | Performed by: INTERNAL MEDICINE

## 2020-02-10 PROCEDURE — 1159F PR MEDICATION LIST DOCUMENTED IN MEDICAL RECORD: ICD-10-PCS | Mod: S$GLB,,, | Performed by: INTERNAL MEDICINE

## 2020-02-10 PROCEDURE — 3044F HG A1C LEVEL LT 7.0%: CPT | Mod: CPTII,S$GLB,, | Performed by: INTERNAL MEDICINE

## 2020-02-10 NOTE — PROGRESS NOTES
Subjective:      Patient ID: Deepali Madrid is a 69 y.o. female.    Chief Complaint: Follow-up    Patient with h/o DM. Random blood sugar are runnintg 120-140s. Patient last HbA1c was 6.8. Patient denies polyuria, polydipsia, numbness in hands or feet. Patient reports compliance with medications. Patient is not adherent to diet. Patient BP are better controlled and is taking Lisnopril 10mg for renal protection. . NO CP, SOB, edema.    Patient is being followed by Endocrinology for Hyper-parathyroidism. Patient also has Osteoporosis and is on Alendronate.     Patient was found to have DVT Left popliteal vein in Aug/2018 and was also had Factor V laden Mutation. Patient was taking Xarelto but caused gum bleed and so the medicine was changed to Eliquis and patient seems to be tolerating it well. Patient reports No blood in stool or black colored stool. Patient recently had a repeat US that showed the DVT is still there.     Patient Colguard test was positive and had a colonoscopy in Oct-2019 by Dr. Sun and reports two polyps were removed.  Repeat Colonoscopy in 5 years.       Review of Systems   Constitutional: Negative for chills, diaphoresis, fever, malaise/fatigue and weight loss.   HENT: Negative for congestion, ear pain, sinus pain, sore throat and tinnitus.    Eyes: Negative for blurred vision and photophobia.   Respiratory: Negative for cough, hemoptysis, shortness of breath and wheezing.    Cardiovascular: Negative for chest pain, palpitations, orthopnea, leg swelling and PND.   Gastrointestinal: Negative for abdominal pain, blood in stool, constipation, diarrhea, heartburn, melena, nausea and vomiting.   Genitourinary: Negative for dysuria, frequency and urgency.   Musculoskeletal: Negative for back pain (imrpoved after Intraspinal injection by Dr. Wells), myalgias and neck pain.   Skin: Negative for rash.   Neurological: Negative for dizziness, tremors, seizures, loss of consciousness and weakness.    Endo/Heme/Allergies: Negative for polydipsia.   Psychiatric/Behavioral: Negative for depression and hallucinations. The patient does not have insomnia.      Objective:     Physical Exam   Constitutional: She is oriented to person, place, and time. No distress.   Neck: No thyromegaly present.   Cardiovascular: Normal rate, regular rhythm and normal heart sounds.   No murmur heard.  Pulmonary/Chest: Effort normal and breath sounds normal. No respiratory distress. She has no wheezes.   Abdominal: Soft. Bowel sounds are normal. She exhibits no distension. There is no tenderness.   Musculoskeletal: She exhibits no edema.   Lymphadenopathy:     She has no cervical adenopathy.   Neurological: She is alert and oriented to person, place, and time.   Skin: She is not diaphoretic.   Psychiatric: She has a normal mood and affect. Her behavior is normal. Judgment and thought content normal.     Assessment:     1. Type 2 diabetes mellitus with diabetic polyneuropathy, with long-term current use of insulin    2. Hyperparathyroidism    3. Acute deep vein thrombosis (DVT) of popliteal vein of left lower extremity       Plan:   Patient blood sugars seem under okay control.  Last HbA1c is 6.8.  Will check Annual  labs under.   Patient has hyperparathyroidism with elevated calcium.  Patient is being followed by endocrinologist  Patient has DVT and is on Eliquis.  Patient also has factor V laden mutation  Will continue anti coagulation.  Patient:  GERD screening was positive and had underwent colonoscopy.  Patient was found to have 2 polyps.  Will get report from Dr. Sun

## 2020-02-21 DIAGNOSIS — E11.42 TYPE 2 DIABETES MELLITUS WITH DIABETIC POLYNEUROPATHY, WITH LONG-TERM CURRENT USE OF INSULIN: ICD-10-CM

## 2020-02-21 DIAGNOSIS — Z79.4 TYPE 2 DIABETES MELLITUS WITH DIABETIC POLYNEUROPATHY, WITH LONG-TERM CURRENT USE OF INSULIN: ICD-10-CM

## 2020-02-24 RX ORDER — PRAVASTATIN SODIUM 40 MG/1
40 TABLET ORAL DAILY
Qty: 90 TABLET | Refills: 0 | Status: SHIPPED | OUTPATIENT
Start: 2020-02-24 | End: 2021-02-11

## 2020-02-28 RX ORDER — GLIMEPIRIDE 2 MG/1
2 TABLET ORAL
Qty: 30 TABLET | Refills: 1 | Status: SHIPPED | OUTPATIENT
Start: 2020-02-28 | End: 2020-03-29

## 2020-02-28 NOTE — TELEPHONE ENCOUNTER
----- Message from Essie Collazo sent at 2/28/2020 12:30 PM CST -----   Patient needs a refill of Glimeperide to [facility].

## 2020-04-17 RX ORDER — LISINOPRIL 10 MG/1
10 TABLET ORAL DAILY
Qty: 30 TABLET | Refills: 0 | Status: SHIPPED | OUTPATIENT
Start: 2020-04-17 | End: 2020-05-18 | Stop reason: SDUPTHER

## 2020-04-17 NOTE — TELEPHONE ENCOUNTER
----- Message from Billie Larry sent at 4/17/2020  9:17 AM CDT -----  Contact: self [phone]  Type:  RX Refill Request    Who Called: Deepali Madrid  Refill or New Rx: refill  RX Name and Strength: Lisinopril 10mg   How is the patient currently taking it? (ex. 1XDay):1x day   Is this a 30 day or 90 day RX: 90 day  Preferred Pharmacy with phone number:     [facility] - [address] [address]  Phone: [phone] Fax: [phone]    Local or Mail Order: local  Ordering Provider:Justin  Would the patient rather a call back or a response via [facility]? Call back   Best Call Back Number:[phone]  Additional Information:

## 2020-05-06 RX ORDER — METFORMIN HYDROCHLORIDE 1000 MG/1
TABLET ORAL
Qty: 60 TABLET | Refills: 2 | OUTPATIENT
Start: 2020-05-06 | End: 2020-11-02 | Stop reason: SDUPTHER

## 2020-05-06 RX ORDER — GLIMEPIRIDE 2 MG/1
2 TABLET ORAL DAILY
Qty: 90 TABLET | Refills: 3 | Status: SHIPPED | OUTPATIENT
Start: 2020-05-06 | End: 2020-06-18 | Stop reason: SDUPTHER

## 2020-05-18 RX ORDER — LISINOPRIL 10 MG/1
10 TABLET ORAL DAILY
Qty: 30 TABLET | Refills: 0 | Status: SHIPPED | OUTPATIENT
Start: 2020-05-18 | End: 2020-06-18 | Stop reason: SDUPTHER

## 2020-06-18 RX ORDER — GLIMEPIRIDE 2 MG/1
2 TABLET ORAL DAILY
Qty: 90 TABLET | Refills: 3 | Status: SHIPPED | OUTPATIENT
Start: 2020-06-18 | End: 2020-11-02 | Stop reason: SDUPTHER

## 2020-06-18 RX ORDER — LISINOPRIL 10 MG/1
10 TABLET ORAL DAILY
Qty: 90 TABLET | Refills: 2 | Status: SHIPPED | OUTPATIENT
Start: 2020-06-18 | End: 2021-03-17

## 2020-06-18 NOTE — TELEPHONE ENCOUNTER
----- Message from Cris Levin sent at 6/18/2020 10:44 AM CDT -----  Regarding: medication refill  Contact: pt  Type:  RX Refill Request    Who Called: pt  Refill or New Rx:refill  RX Name and Strength:glimepiride (AMARYL) 2 MG tablet  How is the patient currently taking it? (ex. 1XDay):once a day  Is this a 30 day or 90 day RX:90  Preferred Pharmacy with phone number:  [facility] [address] [address]  Phone: [phone] Fax: [phone]  Local or Mail Order:local  Ordering Provider:bogdan  Would the patient rather a call back or a response via [facility]? Call back  Best Call Back Number:[phone]  Additional Information: caller is out of the medication

## 2020-10-15 RX ORDER — METFORMIN HYDROCHLORIDE 1000 MG/1
TABLET ORAL
Qty: 60 TABLET | Refills: 2 | OUTPATIENT
Start: 2020-10-15

## 2020-10-15 NOTE — TELEPHONE ENCOUNTER
----- Message from Billie Larry sent at 10/15/2020  3:37 PM CDT -----  Regarding: Pt advice  Contact: Pt  Pt is calling to speak to Maria Del Rosario regarding the refill request for metformin that was refused by Dr Anderson. Please call back at [phone]//thank you acc

## 2020-10-21 ENCOUNTER — TELEPHONE (OUTPATIENT)
Dept: PRIMARY CARE CLINIC | Facility: CLINIC | Age: 70
End: 2020-10-21

## 2020-10-21 NOTE — TELEPHONE ENCOUNTER
----- Message from Erma Meyer MA sent at 10/21/2020  8:38 AM CDT -----  Contact: self  She needs an appt for medication refills. He will refill any of her medications until she is seen. Thank you  ----- Message -----  From: Peg Aburto  Sent: 10/20/2020   2:40 PM CDT  To: [facility] Staff    Requesting call back regarding pt states its been over two weeks pt has been calling regarding refill and has not gotten a call back. Please call back at [phone].

## 2020-11-02 ENCOUNTER — OFFICE VISIT (OUTPATIENT)
Dept: PRIMARY CARE CLINIC | Facility: CLINIC | Age: 70
End: 2020-11-02
Payer: MEDICARE

## 2020-11-02 VITALS
SYSTOLIC BLOOD PRESSURE: 123 MMHG | DIASTOLIC BLOOD PRESSURE: 75 MMHG | BODY MASS INDEX: 24.86 KG/M2 | HEART RATE: 72 BPM | OXYGEN SATURATION: 96 % | TEMPERATURE: 97 F | RESPIRATION RATE: 16 BRPM | WEIGHT: 164 LBS | HEIGHT: 68 IN

## 2020-11-02 DIAGNOSIS — E11.9 TYPE 2 DIABETES MELLITUS WITHOUT COMPLICATION, WITHOUT LONG-TERM CURRENT USE OF INSULIN: ICD-10-CM

## 2020-11-02 DIAGNOSIS — I82.532 CHRONIC DEEP VEIN THROMBOSIS (DVT) OF POPLITEAL VEIN OF LEFT LOWER EXTREMITY: ICD-10-CM

## 2020-11-02 DIAGNOSIS — M81.0 OSTEOPOROSIS, UNSPECIFIED OSTEOPOROSIS TYPE, UNSPECIFIED PATHOLOGICAL FRACTURE PRESENCE: ICD-10-CM

## 2020-11-02 DIAGNOSIS — E21.3 HYPERPARATHYROIDISM: Primary | ICD-10-CM

## 2020-11-02 PROCEDURE — 3044F PR MOST RECENT HEMOGLOBIN A1C LEVEL <7.0%: ICD-10-PCS | Mod: CPTII,S$GLB,, | Performed by: INTERNAL MEDICINE

## 2020-11-02 PROCEDURE — 99214 OFFICE O/P EST MOD 30 MIN: CPT | Mod: S$GLB,,, | Performed by: INTERNAL MEDICINE

## 2020-11-02 PROCEDURE — 1159F PR MEDICATION LIST DOCUMENTED IN MEDICAL RECORD: ICD-10-PCS | Mod: S$GLB,,, | Performed by: INTERNAL MEDICINE

## 2020-11-02 PROCEDURE — 99214 PR OFFICE/OUTPT VISIT, EST, LEVL IV, 30-39 MIN: ICD-10-PCS | Mod: S$GLB,,, | Performed by: INTERNAL MEDICINE

## 2020-11-02 PROCEDURE — 3008F PR BODY MASS INDEX (BMI) DOCUMENTED: ICD-10-PCS | Mod: CPTII,S$GLB,, | Performed by: INTERNAL MEDICINE

## 2020-11-02 PROCEDURE — 3008F BODY MASS INDEX DOCD: CPT | Mod: CPTII,S$GLB,, | Performed by: INTERNAL MEDICINE

## 2020-11-02 PROCEDURE — 3044F HG A1C LEVEL LT 7.0%: CPT | Mod: CPTII,S$GLB,, | Performed by: INTERNAL MEDICINE

## 2020-11-02 PROCEDURE — 1159F MED LIST DOCD IN RCRD: CPT | Mod: S$GLB,,, | Performed by: INTERNAL MEDICINE

## 2020-11-02 RX ORDER — GLIMEPIRIDE 2 MG/1
2 TABLET ORAL DAILY
Qty: 90 TABLET | Refills: 3 | Status: SHIPPED | OUTPATIENT
Start: 2020-11-02 | End: 2021-01-05 | Stop reason: SDUPTHER

## 2020-11-02 RX ORDER — METFORMIN HYDROCHLORIDE 1000 MG/1
TABLET ORAL
Qty: 60 TABLET | Refills: 2 | Status: SHIPPED | OUTPATIENT
Start: 2020-11-02 | End: 2020-11-09

## 2020-11-02 RX ORDER — ALENDRONATE SODIUM 70 MG/1
70 TABLET ORAL
Qty: 4 TABLET | Refills: 3 | Status: SHIPPED | OUTPATIENT
Start: 2020-11-02 | End: 2021-06-02

## 2020-12-30 LAB
ABS NRBC COUNT: 0 X 10 3/UL (ref 0–0.01)
ABSOLUTE BASOPHIL: 0.03 X 10 3/UL (ref 0–0.22)
ABSOLUTE EOSINOPHIL: 0.06 X 10 3/UL (ref 0.04–0.54)
ABSOLUTE IMMATURE GRAN: 0.02 X 10 3/UL (ref 0–0.04)
ABSOLUTE LYMPHOCYTE: 1.86 X 10 3/UL (ref 0.86–4.75)
ABSOLUTE MONOCYTE: 0.37 X 10 3/UL (ref 0.22–1.08)
ALBUMIN SERPL-MCNC: 4.2 G/DL (ref 3.5–5.2)
ALBUMIN/GLOB SERPL ELPH: 1.2 {RATIO} (ref 1–2.7)
ALP ISOS SERPL LEV INH-CCNC: 89 U/L (ref 35–105)
ALT (SGPT): 34 U/L (ref 0–33)
ANION GAP SERPL CALC-SCNC: 10 MMOL/L (ref 8–17)
AST SERPL-CCNC: 49 U/L (ref 0–32)
BASOPHILS NFR BLD: 0.5 % (ref 0.2–1.2)
BILIRUBIN, TOTAL: 0.67 MG/DL (ref 0–1.2)
BUN/CREAT SERPL: 12.7 (ref 6–20)
CALCIUM SERPL-MCNC: 11.4 MG/DL (ref 8.6–10.2)
CARBON DIOXIDE, CO2: 26 MMOL/L (ref 22–29)
CHLORIDE: 105 MMOL/L (ref 98–107)
CHOLEST SERPL-MSCNC: 166 MG/DL (ref 100–200)
CREAT SERPL-MCNC: 0.81 MG/DL (ref 0.5–0.9)
CREATININE RANDOM URINE: 42.1 MG/DL (ref 28–217)
EOSINOPHIL NFR BLD: 1.1 % (ref 0.7–7)
ESTIMATED AVERAGE GLUCOSE: 289 MG/DL
GFR ESTIMATION: 69.9
GLOBULIN: 3.4 G/DL (ref 1.5–4.5)
GLUCOSE: 350 MG/DL (ref 82–115)
HBA1C MFR BLD: 11.7 % (ref 4–6)
HCT VFR BLD AUTO: 42.7 % (ref 37–47)
HDLC SERPL-MCNC: 37 MG/DL
HGB BLD-MCNC: 14.4 G/DL (ref 12–16)
IMMATURE GRANULOCYTES: 0.4 % (ref 0–0.5)
LDL/HDL RATIO: 2.4 (ref 1–3)
LDLC SERPL CALC-MCNC: 88.2 MG/DL (ref 0–100)
LYMPHOCYTES NFR BLD: 32.6 % (ref 19.3–53.1)
MCH RBC QN AUTO: 30.6 PG (ref 27–32)
MCHC RBC AUTO-ENTMCNC: 33.7 G/DL (ref 32–36)
MCV RBC AUTO: 90.7 FL (ref 82–100)
MICROALBUMIN QUANT: <1.2 MG/DL (ref 0–2)
MICROALBUMIN/CREATININE RATIO: NORMAL UG/MG (ref 0–30)
MONOCYTES NFR BLD: 6.5 % (ref 4.7–12.5)
NEUTROPHILS # BLD AUTO: 3.37 X 10 3/UL (ref 2.15–7.56)
NEUTROPHILS NFR BLD: 58.9 %
NUCLEATED RED BLOOD CELLS: 0 /100 WBC (ref 0–0.2)
PLATELET # BLD AUTO: 168 X 10 3/UL (ref 135–400)
POTASSIUM: 4.9 MMOL/L (ref 3.5–5.1)
PROT SNV-MCNC: 7.6 G/DL (ref 6.4–8.3)
RBC # BLD AUTO: 4.71 X 10 6/UL (ref 4.2–5.4)
RDW-SD: 43.7 FL (ref 37–54)
SODIUM: 141 MMOL/L (ref 136–145)
TRIGL SERPL-MCNC: 204 MG/DL (ref 0–150)
TSH SERPL DL<=0.005 MIU/L-ACNC: 0.73 UIU/ML (ref 0.27–4.2)
UREA NITROGEN (BUN): 10.3 MG/DL (ref 8–23)
WBC # BLD: 5.71 X 10 3/UL (ref 4.3–10.8)

## 2021-01-05 ENCOUNTER — OFFICE VISIT (OUTPATIENT)
Dept: PRIMARY CARE CLINIC | Facility: CLINIC | Age: 71
End: 2021-01-05
Payer: MEDICARE

## 2021-01-05 VITALS
HEART RATE: 72 BPM | BODY MASS INDEX: 24.68 KG/M2 | SYSTOLIC BLOOD PRESSURE: 120 MMHG | HEIGHT: 68 IN | WEIGHT: 162.81 LBS | TEMPERATURE: 98 F | OXYGEN SATURATION: 97 % | DIASTOLIC BLOOD PRESSURE: 79 MMHG

## 2021-01-05 DIAGNOSIS — E21.3 HYPERPARATHYROIDISM: ICD-10-CM

## 2021-01-05 DIAGNOSIS — M81.0 OSTEOPOROSIS, UNSPECIFIED OSTEOPOROSIS TYPE, UNSPECIFIED PATHOLOGICAL FRACTURE PRESENCE: ICD-10-CM

## 2021-01-05 DIAGNOSIS — Z12.31 BREAST CANCER SCREENING BY MAMMOGRAM: ICD-10-CM

## 2021-01-05 DIAGNOSIS — I82.532 CHRONIC DEEP VEIN THROMBOSIS (DVT) OF POPLITEAL VEIN OF LEFT LOWER EXTREMITY: ICD-10-CM

## 2021-01-05 DIAGNOSIS — E11.9 TYPE 2 DIABETES MELLITUS WITHOUT COMPLICATION, WITHOUT LONG-TERM CURRENT USE OF INSULIN: Primary | ICD-10-CM

## 2021-01-05 DIAGNOSIS — Z00.00 MEDICARE ANNUAL WELLNESS VISIT, SUBSEQUENT: ICD-10-CM

## 2021-01-05 PROCEDURE — 1159F PR MEDICATION LIST DOCUMENTED IN MEDICAL RECORD: ICD-10-PCS | Mod: S$GLB,,, | Performed by: INTERNAL MEDICINE

## 2021-01-05 PROCEDURE — 3046F HEMOGLOBIN A1C LEVEL >9.0%: CPT | Mod: CPTII,S$GLB,, | Performed by: INTERNAL MEDICINE

## 2021-01-05 PROCEDURE — 3008F PR BODY MASS INDEX (BMI) DOCUMENTED: ICD-10-PCS | Mod: CPTII,S$GLB,, | Performed by: INTERNAL MEDICINE

## 2021-01-05 PROCEDURE — 99397 PR PREVENTIVE VISIT,EST,65 & OVER: ICD-10-PCS | Mod: S$GLB,,, | Performed by: INTERNAL MEDICINE

## 2021-01-05 PROCEDURE — 3008F BODY MASS INDEX DOCD: CPT | Mod: CPTII,S$GLB,, | Performed by: INTERNAL MEDICINE

## 2021-01-05 PROCEDURE — 1159F MED LIST DOCD IN RCRD: CPT | Mod: S$GLB,,, | Performed by: INTERNAL MEDICINE

## 2021-01-05 PROCEDURE — 3046F PR MOST RECENT HEMOGLOBIN A1C LEVEL > 9.0%: ICD-10-PCS | Mod: CPTII,S$GLB,, | Performed by: INTERNAL MEDICINE

## 2021-01-05 PROCEDURE — 99397 PER PM REEVAL EST PAT 65+ YR: CPT | Mod: S$GLB,,, | Performed by: INTERNAL MEDICINE

## 2021-01-05 RX ORDER — GLIMEPIRIDE 2 MG/1
4 TABLET ORAL DAILY
Qty: 90 TABLET | Refills: 3 | Status: SHIPPED | OUTPATIENT
Start: 2021-01-05 | End: 2021-04-27

## 2021-01-05 RX ORDER — DULAGLUTIDE 1.5 MG/.5ML
1.5 INJECTION, SOLUTION SUBCUTANEOUS WEEKLY
Qty: 4 PEN | Refills: 2 | Status: SHIPPED | OUTPATIENT
Start: 2021-01-05 | End: 2021-06-02

## 2021-01-26 ENCOUNTER — OFFICE VISIT (OUTPATIENT)
Dept: PRIMARY CARE CLINIC | Facility: CLINIC | Age: 71
End: 2021-01-26
Payer: MEDICARE

## 2021-01-26 VITALS
TEMPERATURE: 97 F | SYSTOLIC BLOOD PRESSURE: 105 MMHG | HEIGHT: 68 IN | HEART RATE: 66 BPM | OXYGEN SATURATION: 96 % | WEIGHT: 162 LBS | RESPIRATION RATE: 16 BRPM | BODY MASS INDEX: 24.55 KG/M2 | DIASTOLIC BLOOD PRESSURE: 72 MMHG

## 2021-01-26 DIAGNOSIS — E11.9 TYPE 2 DIABETES MELLITUS WITHOUT COMPLICATION, WITHOUT LONG-TERM CURRENT USE OF INSULIN: Primary | ICD-10-CM

## 2021-01-26 DIAGNOSIS — I82.532 CHRONIC DEEP VEIN THROMBOSIS (DVT) OF POPLITEAL VEIN OF LEFT LOWER EXTREMITY: ICD-10-CM

## 2021-01-26 DIAGNOSIS — M81.0 AGE-RELATED OSTEOPOROSIS WITHOUT CURRENT PATHOLOGICAL FRACTURE: ICD-10-CM

## 2021-01-26 PROCEDURE — 3046F HEMOGLOBIN A1C LEVEL >9.0%: CPT | Mod: CPTII,S$GLB,, | Performed by: INTERNAL MEDICINE

## 2021-01-26 PROCEDURE — 1159F MED LIST DOCD IN RCRD: CPT | Mod: S$GLB,,, | Performed by: INTERNAL MEDICINE

## 2021-01-26 PROCEDURE — 3046F PR MOST RECENT HEMOGLOBIN A1C LEVEL > 9.0%: ICD-10-PCS | Mod: CPTII,S$GLB,, | Performed by: INTERNAL MEDICINE

## 2021-01-26 PROCEDURE — 99214 PR OFFICE/OUTPT VISIT, EST, LEVL IV, 30-39 MIN: ICD-10-PCS | Mod: S$GLB,,, | Performed by: INTERNAL MEDICINE

## 2021-01-26 PROCEDURE — 1159F PR MEDICATION LIST DOCUMENTED IN MEDICAL RECORD: ICD-10-PCS | Mod: S$GLB,,, | Performed by: INTERNAL MEDICINE

## 2021-01-26 PROCEDURE — 3008F BODY MASS INDEX DOCD: CPT | Mod: CPTII,S$GLB,, | Performed by: INTERNAL MEDICINE

## 2021-01-26 PROCEDURE — 99214 OFFICE O/P EST MOD 30 MIN: CPT | Mod: S$GLB,,, | Performed by: INTERNAL MEDICINE

## 2021-01-26 PROCEDURE — 3008F PR BODY MASS INDEX (BMI) DOCUMENTED: ICD-10-PCS | Mod: CPTII,S$GLB,, | Performed by: INTERNAL MEDICINE

## 2021-03-10 LAB
LEFT EYE DM RETINOPATHY: NEGATIVE
RIGHT EYE DM RETINOPATHY: NEGATIVE

## 2021-04-12 DIAGNOSIS — E11.9 TYPE 2 DIABETES MELLITUS WITHOUT COMPLICATION, WITHOUT LONG-TERM CURRENT USE OF INSULIN: ICD-10-CM

## 2021-04-12 RX ORDER — METFORMIN HYDROCHLORIDE 1000 MG/1
TABLET ORAL
Qty: 60 TABLET | Refills: 0 | Status: SHIPPED | OUTPATIENT
Start: 2021-04-12 | End: 2021-04-12 | Stop reason: SDUPTHER

## 2021-04-13 RX ORDER — METFORMIN HYDROCHLORIDE 1000 MG/1
1000 TABLET ORAL 2 TIMES DAILY
Qty: 60 TABLET | Refills: 0 | Status: SHIPPED | OUTPATIENT
Start: 2021-04-13 | End: 2021-06-15

## 2021-04-21 DIAGNOSIS — R74.8 ELEVATED LIVER ENZYMES: Primary | ICD-10-CM

## 2021-04-26 LAB
ALBUMIN SERPL-MCNC: 4.3 G/DL (ref 3.6–5.1)
ALBUMIN/GLOB SERPL: 1.4 (CALC) (ref 1–2.5)
ALP SERPL-CCNC: 65 U/L (ref 37–153)
ALT SERPL-CCNC: 32 U/L (ref 6–29)
AST SERPL-CCNC: 63 U/L (ref 10–35)
BILIRUB SERPL-MCNC: 0.9 MG/DL (ref 0.2–1.2)
BUN SERPL-MCNC: 18 MG/DL (ref 7–25)
BUN/CREAT SERPL: ABNORMAL (CALC) (ref 6–22)
CALCIUM SERPL-MCNC: 11.3 MG/DL (ref 8.6–10.4)
CHLORIDE SERPL-SCNC: 103 MMOL/L (ref 98–110)
CO2 SERPL-SCNC: 23 MMOL/L (ref 20–32)
CREAT SERPL-MCNC: 0.72 MG/DL (ref 0.6–0.93)
F5 GENE MUT ANL BLD/T: ABNORMAL
F5 P.R506Q BLD/T QL: POSITIVE
GLOBULIN SER CALC-MCNC: 3.1 G/DL (CALC) (ref 1.9–3.7)
GLUCOSE SERPL-MCNC: 266 MG/DL (ref 65–99)
HBA1C MFR BLD: 8.7 % OF TOTAL HGB
POTASSIUM SERPL-SCNC: 4.4 MMOL/L (ref 3.5–5.3)
PROT SERPL-MCNC: 7.4 G/DL (ref 6.1–8.1)
SODIUM SERPL-SCNC: 136 MMOL/L (ref 135–146)

## 2021-04-27 ENCOUNTER — OFFICE VISIT (OUTPATIENT)
Dept: PRIMARY CARE CLINIC | Facility: CLINIC | Age: 71
End: 2021-04-27
Payer: MEDICARE

## 2021-04-27 VITALS
TEMPERATURE: 97 F | WEIGHT: 164 LBS | BODY MASS INDEX: 24.86 KG/M2 | OXYGEN SATURATION: 96 % | SYSTOLIC BLOOD PRESSURE: 113 MMHG | HEIGHT: 68 IN | HEART RATE: 71 BPM | RESPIRATION RATE: 14 BRPM | DIASTOLIC BLOOD PRESSURE: 71 MMHG

## 2021-04-27 DIAGNOSIS — M81.0 OSTEOPOROSIS, UNSPECIFIED OSTEOPOROSIS TYPE, UNSPECIFIED PATHOLOGICAL FRACTURE PRESENCE: ICD-10-CM

## 2021-04-27 DIAGNOSIS — E11.9 TYPE 2 DIABETES MELLITUS WITHOUT COMPLICATION, WITHOUT LONG-TERM CURRENT USE OF INSULIN: Primary | ICD-10-CM

## 2021-04-27 DIAGNOSIS — D68.51 FACTOR V LEIDEN MUTATION: ICD-10-CM

## 2021-04-27 DIAGNOSIS — E21.3 HYPERPARATHYROIDISM: ICD-10-CM

## 2021-04-27 LAB — GLUCOSE SERPL-MCNC: 115 MG/DL (ref 70–110)

## 2021-04-27 PROCEDURE — 82962 POCT GLUCOSE, HAND-HELD DEVICE: ICD-10-PCS | Mod: ,,, | Performed by: INTERNAL MEDICINE

## 2021-04-27 PROCEDURE — 99214 PR OFFICE/OUTPT VISIT, EST, LEVL IV, 30-39 MIN: ICD-10-PCS | Mod: S$GLB,,, | Performed by: INTERNAL MEDICINE

## 2021-04-27 PROCEDURE — 3008F PR BODY MASS INDEX (BMI) DOCUMENTED: ICD-10-PCS | Mod: CPTII,S$GLB,, | Performed by: INTERNAL MEDICINE

## 2021-04-27 PROCEDURE — 3008F BODY MASS INDEX DOCD: CPT | Mod: CPTII,S$GLB,, | Performed by: INTERNAL MEDICINE

## 2021-04-27 PROCEDURE — 3052F HG A1C>EQUAL 8.0%<EQUAL 9.0%: CPT | Mod: CPTII,S$GLB,, | Performed by: INTERNAL MEDICINE

## 2021-04-27 PROCEDURE — 1159F MED LIST DOCD IN RCRD: CPT | Mod: S$GLB,,, | Performed by: INTERNAL MEDICINE

## 2021-04-27 PROCEDURE — 99214 OFFICE O/P EST MOD 30 MIN: CPT | Mod: S$GLB,,, | Performed by: INTERNAL MEDICINE

## 2021-04-27 PROCEDURE — 3052F PR MOST RECENT HEMOGLOBIN A1C LEVEL 8.0 - < 9.0%: ICD-10-PCS | Mod: CPTII,S$GLB,, | Performed by: INTERNAL MEDICINE

## 2021-04-27 PROCEDURE — 1126F PR PAIN SEVERITY QUANTIFIED, NO PAIN PRESENT: ICD-10-PCS | Mod: S$GLB,,, | Performed by: INTERNAL MEDICINE

## 2021-04-27 PROCEDURE — 1126F AMNT PAIN NOTED NONE PRSNT: CPT | Mod: S$GLB,,, | Performed by: INTERNAL MEDICINE

## 2021-04-27 PROCEDURE — 1159F PR MEDICATION LIST DOCUMENTED IN MEDICAL RECORD: ICD-10-PCS | Mod: S$GLB,,, | Performed by: INTERNAL MEDICINE

## 2021-04-27 PROCEDURE — 82962 GLUCOSE BLOOD TEST: CPT | Mod: ,,, | Performed by: INTERNAL MEDICINE

## 2021-04-27 RX ORDER — GLIMEPIRIDE 4 MG/1
4 TABLET ORAL
Qty: 90 TABLET | Refills: 3 | Status: SHIPPED | OUTPATIENT
Start: 2021-04-27 | End: 2021-11-02

## 2021-05-24 ENCOUNTER — TELEPHONE (OUTPATIENT)
Dept: PRIMARY CARE CLINIC | Facility: CLINIC | Age: 71
End: 2021-05-24

## 2021-06-29 ENCOUNTER — OFFICE VISIT (OUTPATIENT)
Dept: PRIMARY CARE CLINIC | Facility: CLINIC | Age: 71
End: 2021-06-29
Payer: MEDICARE

## 2021-06-29 VITALS
HEIGHT: 68 IN | SYSTOLIC BLOOD PRESSURE: 124 MMHG | OXYGEN SATURATION: 99 % | DIASTOLIC BLOOD PRESSURE: 78 MMHG | TEMPERATURE: 97 F | RESPIRATION RATE: 18 BRPM | HEART RATE: 79 BPM | WEIGHT: 165 LBS | BODY MASS INDEX: 25.01 KG/M2

## 2021-06-29 DIAGNOSIS — E11.9 TYPE 2 DIABETES MELLITUS WITHOUT COMPLICATION, WITHOUT LONG-TERM CURRENT USE OF INSULIN: Primary | ICD-10-CM

## 2021-06-29 PROCEDURE — 3008F PR BODY MASS INDEX (BMI) DOCUMENTED: ICD-10-PCS | Mod: CPTII,S$GLB,, | Performed by: INTERNAL MEDICINE

## 2021-06-29 PROCEDURE — 3052F HG A1C>EQUAL 8.0%<EQUAL 9.0%: CPT | Mod: CPTII,S$GLB,, | Performed by: INTERNAL MEDICINE

## 2021-06-29 PROCEDURE — 3008F BODY MASS INDEX DOCD: CPT | Mod: CPTII,S$GLB,, | Performed by: INTERNAL MEDICINE

## 2021-06-29 PROCEDURE — 99214 OFFICE O/P EST MOD 30 MIN: CPT | Mod: S$GLB,,, | Performed by: INTERNAL MEDICINE

## 2021-06-29 PROCEDURE — 3052F PR MOST RECENT HEMOGLOBIN A1C LEVEL 8.0 - < 9.0%: ICD-10-PCS | Mod: CPTII,S$GLB,, | Performed by: INTERNAL MEDICINE

## 2021-06-29 PROCEDURE — 1159F MED LIST DOCD IN RCRD: CPT | Mod: S$GLB,,, | Performed by: INTERNAL MEDICINE

## 2021-06-29 PROCEDURE — 99214 PR OFFICE/OUTPT VISIT, EST, LEVL IV, 30-39 MIN: ICD-10-PCS | Mod: S$GLB,,, | Performed by: INTERNAL MEDICINE

## 2021-06-29 PROCEDURE — 1159F PR MEDICATION LIST DOCUMENTED IN MEDICAL RECORD: ICD-10-PCS | Mod: S$GLB,,, | Performed by: INTERNAL MEDICINE

## 2021-06-29 RX ORDER — GLIMEPIRIDE 2 MG/1
2 TABLET ORAL
Qty: 90 TABLET | Refills: 3 | Status: SHIPPED | OUTPATIENT
Start: 2021-06-29 | End: 2022-05-09

## 2021-06-30 LAB
ALBUMIN SERPL-MCNC: 4.2 G/DL (ref 3.6–5.1)
ALBUMIN/GLOB SERPL: 1.4 (CALC) (ref 1–2.5)
ALP SERPL-CCNC: 123 U/L (ref 37–153)
ALT SERPL-CCNC: 27 U/L (ref 6–29)
AST SERPL-CCNC: 51 U/L (ref 10–35)
BILIRUB SERPL-MCNC: 0.8 MG/DL (ref 0.2–1.2)
BUN SERPL-MCNC: 13 MG/DL (ref 7–25)
BUN/CREAT SERPL: ABNORMAL (CALC) (ref 6–22)
CALCIUM SERPL-MCNC: 11.5 MG/DL (ref 8.6–10.4)
CHLORIDE SERPL-SCNC: 104 MMOL/L (ref 98–110)
CO2 SERPL-SCNC: 25 MMOL/L (ref 20–32)
CREAT SERPL-MCNC: 0.74 MG/DL (ref 0.6–0.93)
GLOBULIN SER CALC-MCNC: 2.9 G/DL (CALC) (ref 1.9–3.7)
GLUCOSE SERPL-MCNC: 217 MG/DL (ref 65–99)
HBA1C MFR BLD: 8.3 % OF TOTAL HGB
HBV CORE AB SERPL QL IA: NORMAL
HBV SURFACE AB SER QL IA: NORMAL
HBV SURFACE AG SERPL QL IA: NORMAL
HCV AB S/CO SERPL IA: 0.04
HCV AB SERPL QL IA: NORMAL
POTASSIUM SERPL-SCNC: 5.1 MMOL/L (ref 3.5–5.3)
PROT SERPL-MCNC: 7.1 G/DL (ref 6.1–8.1)
SODIUM SERPL-SCNC: 138 MMOL/L (ref 135–146)

## 2021-07-02 ENCOUNTER — TELEPHONE (OUTPATIENT)
Dept: PRIMARY CARE CLINIC | Facility: CLINIC | Age: 71
End: 2021-07-02

## 2021-08-10 ENCOUNTER — PATIENT OUTREACH (OUTPATIENT)
Dept: ADMINISTRATIVE | Facility: HOSPITAL | Age: 71
End: 2021-08-10

## 2021-08-10 ENCOUNTER — OFFICE VISIT (OUTPATIENT)
Dept: PRIMARY CARE CLINIC | Facility: CLINIC | Age: 71
End: 2021-08-10
Payer: MEDICARE

## 2021-08-10 VITALS
BODY MASS INDEX: 24.91 KG/M2 | HEIGHT: 68 IN | RESPIRATION RATE: 16 BRPM | HEART RATE: 66 BPM | SYSTOLIC BLOOD PRESSURE: 104 MMHG | TEMPERATURE: 98 F | WEIGHT: 164.38 LBS | OXYGEN SATURATION: 97 % | DIASTOLIC BLOOD PRESSURE: 63 MMHG

## 2021-08-10 DIAGNOSIS — E21.3 HYPERPARATHYROIDISM: ICD-10-CM

## 2021-08-10 DIAGNOSIS — E11.9 TYPE 2 DIABETES MELLITUS WITHOUT COMPLICATION, WITHOUT LONG-TERM CURRENT USE OF INSULIN: Primary | ICD-10-CM

## 2021-08-10 DIAGNOSIS — M81.0 OSTEOPOROSIS, UNSPECIFIED OSTEOPOROSIS TYPE, UNSPECIFIED PATHOLOGICAL FRACTURE PRESENCE: ICD-10-CM

## 2021-08-10 PROCEDURE — 3008F BODY MASS INDEX DOCD: CPT | Mod: CPTII,S$GLB,, | Performed by: INTERNAL MEDICINE

## 2021-08-10 PROCEDURE — 3052F HG A1C>EQUAL 8.0%<EQUAL 9.0%: CPT | Mod: CPTII,S$GLB,, | Performed by: INTERNAL MEDICINE

## 2021-08-10 PROCEDURE — 3074F SYST BP LT 130 MM HG: CPT | Mod: CPTII,S$GLB,, | Performed by: INTERNAL MEDICINE

## 2021-08-10 PROCEDURE — 3008F PR BODY MASS INDEX (BMI) DOCUMENTED: ICD-10-PCS | Mod: CPTII,S$GLB,, | Performed by: INTERNAL MEDICINE

## 2021-08-10 PROCEDURE — 99214 OFFICE O/P EST MOD 30 MIN: CPT | Mod: S$GLB,,, | Performed by: INTERNAL MEDICINE

## 2021-08-10 PROCEDURE — 3052F PR MOST RECENT HEMOGLOBIN A1C LEVEL 8.0 - < 9.0%: ICD-10-PCS | Mod: CPTII,S$GLB,, | Performed by: INTERNAL MEDICINE

## 2021-08-10 PROCEDURE — 3078F DIAST BP <80 MM HG: CPT | Mod: CPTII,S$GLB,, | Performed by: INTERNAL MEDICINE

## 2021-08-10 PROCEDURE — 3074F PR MOST RECENT SYSTOLIC BLOOD PRESSURE < 130 MM HG: ICD-10-PCS | Mod: CPTII,S$GLB,, | Performed by: INTERNAL MEDICINE

## 2021-08-10 PROCEDURE — 1159F MED LIST DOCD IN RCRD: CPT | Mod: CPTII,S$GLB,, | Performed by: INTERNAL MEDICINE

## 2021-08-10 PROCEDURE — 99214 PR OFFICE/OUTPT VISIT, EST, LEVL IV, 30-39 MIN: ICD-10-PCS | Mod: S$GLB,,, | Performed by: INTERNAL MEDICINE

## 2021-08-10 PROCEDURE — 3078F PR MOST RECENT DIASTOLIC BLOOD PRESSURE < 80 MM HG: ICD-10-PCS | Mod: CPTII,S$GLB,, | Performed by: INTERNAL MEDICINE

## 2021-08-10 PROCEDURE — 1160F RVW MEDS BY RX/DR IN RCRD: CPT | Mod: CPTII,S$GLB,, | Performed by: INTERNAL MEDICINE

## 2021-08-10 PROCEDURE — 1160F PR REVIEW ALL MEDS BY PRESCRIBER/CLIN PHARMACIST DOCUMENTED: ICD-10-PCS | Mod: CPTII,S$GLB,, | Performed by: INTERNAL MEDICINE

## 2021-08-10 PROCEDURE — 1159F PR MEDICATION LIST DOCUMENTED IN MEDICAL RECORD: ICD-10-PCS | Mod: CPTII,S$GLB,, | Performed by: INTERNAL MEDICINE

## 2021-10-01 ENCOUNTER — TELEPHONE (OUTPATIENT)
Dept: PRIMARY CARE CLINIC | Facility: CLINIC | Age: 71
End: 2021-10-01

## 2021-10-01 DIAGNOSIS — M81.0 OSTEOPOROSIS, UNSPECIFIED OSTEOPOROSIS TYPE, UNSPECIFIED PATHOLOGICAL FRACTURE PRESENCE: Primary | ICD-10-CM

## 2021-10-01 RX ORDER — RISEDRONATE SODIUM 35 MG/1
35 TABLET, FILM COATED ORAL
Qty: 4 TABLET | Refills: 11 | Status: SHIPPED | OUTPATIENT
Start: 2021-10-01 | End: 2023-02-20

## 2021-11-02 ENCOUNTER — OFFICE VISIT (OUTPATIENT)
Dept: PRIMARY CARE CLINIC | Facility: CLINIC | Age: 71
End: 2021-11-02
Payer: MEDICARE

## 2021-11-02 VITALS
WEIGHT: 168.38 LBS | BODY MASS INDEX: 25.52 KG/M2 | OXYGEN SATURATION: 97 % | RESPIRATION RATE: 16 BRPM | SYSTOLIC BLOOD PRESSURE: 104 MMHG | HEART RATE: 82 BPM | TEMPERATURE: 97 F | HEIGHT: 68 IN | DIASTOLIC BLOOD PRESSURE: 64 MMHG

## 2021-11-02 DIAGNOSIS — M81.0 OSTEOPOROSIS, UNSPECIFIED OSTEOPOROSIS TYPE, UNSPECIFIED PATHOLOGICAL FRACTURE PRESENCE: ICD-10-CM

## 2021-11-02 DIAGNOSIS — E11.9 TYPE 2 DIABETES MELLITUS WITHOUT COMPLICATION, WITHOUT LONG-TERM CURRENT USE OF INSULIN: Primary | ICD-10-CM

## 2021-11-02 DIAGNOSIS — E21.3 HYPERPARATHYROIDISM: ICD-10-CM

## 2021-11-02 PROCEDURE — 3008F BODY MASS INDEX DOCD: CPT | Mod: CPTII,S$GLB,, | Performed by: INTERNAL MEDICINE

## 2021-11-02 PROCEDURE — 1160F PR REVIEW ALL MEDS BY PRESCRIBER/CLIN PHARMACIST DOCUMENTED: ICD-10-PCS | Mod: CPTII,S$GLB,, | Performed by: INTERNAL MEDICINE

## 2021-11-02 PROCEDURE — 4010F ACE/ARB THERAPY RXD/TAKEN: CPT | Mod: CPTII,S$GLB,, | Performed by: INTERNAL MEDICINE

## 2021-11-02 PROCEDURE — 3074F PR MOST RECENT SYSTOLIC BLOOD PRESSURE < 130 MM HG: ICD-10-PCS | Mod: CPTII,S$GLB,, | Performed by: INTERNAL MEDICINE

## 2021-11-02 PROCEDURE — 99214 PR OFFICE/OUTPT VISIT, EST, LEVL IV, 30-39 MIN: ICD-10-PCS | Mod: S$GLB,,, | Performed by: INTERNAL MEDICINE

## 2021-11-02 PROCEDURE — 3074F SYST BP LT 130 MM HG: CPT | Mod: CPTII,S$GLB,, | Performed by: INTERNAL MEDICINE

## 2021-11-02 PROCEDURE — 3052F HG A1C>EQUAL 8.0%<EQUAL 9.0%: CPT | Mod: CPTII,S$GLB,, | Performed by: INTERNAL MEDICINE

## 2021-11-02 PROCEDURE — 3008F PR BODY MASS INDEX (BMI) DOCUMENTED: ICD-10-PCS | Mod: CPTII,S$GLB,, | Performed by: INTERNAL MEDICINE

## 2021-11-02 PROCEDURE — 3078F PR MOST RECENT DIASTOLIC BLOOD PRESSURE < 80 MM HG: ICD-10-PCS | Mod: CPTII,S$GLB,, | Performed by: INTERNAL MEDICINE

## 2021-11-02 PROCEDURE — 1159F MED LIST DOCD IN RCRD: CPT | Mod: CPTII,S$GLB,, | Performed by: INTERNAL MEDICINE

## 2021-11-02 PROCEDURE — 3078F DIAST BP <80 MM HG: CPT | Mod: CPTII,S$GLB,, | Performed by: INTERNAL MEDICINE

## 2021-11-02 PROCEDURE — 3052F PR MOST RECENT HEMOGLOBIN A1C LEVEL 8.0 - < 9.0%: ICD-10-PCS | Mod: CPTII,S$GLB,, | Performed by: INTERNAL MEDICINE

## 2021-11-02 PROCEDURE — 99214 OFFICE O/P EST MOD 30 MIN: CPT | Mod: S$GLB,,, | Performed by: INTERNAL MEDICINE

## 2021-11-02 PROCEDURE — 4010F PR ACE/ARB THEARPY RXD/TAKEN: ICD-10-PCS | Mod: CPTII,S$GLB,, | Performed by: INTERNAL MEDICINE

## 2021-11-02 PROCEDURE — 1159F PR MEDICATION LIST DOCUMENTED IN MEDICAL RECORD: ICD-10-PCS | Mod: CPTII,S$GLB,, | Performed by: INTERNAL MEDICINE

## 2021-11-02 PROCEDURE — 1160F RVW MEDS BY RX/DR IN RCRD: CPT | Mod: CPTII,S$GLB,, | Performed by: INTERNAL MEDICINE

## 2021-11-03 LAB
ALBUMIN SERPL-MCNC: 4.5 G/DL (ref 3.6–5.1)
ALBUMIN/GLOB SERPL: 1.4 (CALC) (ref 1–2.5)
ALP SERPL-CCNC: 82 U/L (ref 37–153)
ALT SERPL-CCNC: 30 U/L (ref 6–29)
AST SERPL-CCNC: 53 U/L (ref 10–35)
BILIRUB SERPL-MCNC: 0.9 MG/DL (ref 0.2–1.2)
BUN SERPL-MCNC: 15 MG/DL (ref 7–25)
BUN/CREAT SERPL: ABNORMAL (CALC) (ref 6–22)
CALCIUM SERPL-MCNC: 11.8 MG/DL (ref 8.6–10.4)
CHLORIDE SERPL-SCNC: 103 MMOL/L (ref 98–110)
CO2 SERPL-SCNC: 24 MMOL/L (ref 20–32)
CREAT SERPL-MCNC: 0.82 MG/DL (ref 0.6–0.93)
GLOBULIN SER CALC-MCNC: 3.3 G/DL (CALC) (ref 1.9–3.7)
GLUCOSE SERPL-MCNC: 158 MG/DL (ref 65–99)
HBA1C MFR BLD: 8.4 % OF TOTAL HGB
POTASSIUM SERPL-SCNC: 4.6 MMOL/L (ref 3.5–5.3)
PROT SERPL-MCNC: 7.8 G/DL (ref 6.1–8.1)
PTH-INTACT SERPL-MCNC: 74 PG/ML (ref 14–64)
SODIUM SERPL-SCNC: 139 MMOL/L (ref 135–146)

## 2021-12-03 ENCOUNTER — PATIENT OUTREACH (OUTPATIENT)
Dept: ADMINISTRATIVE | Facility: HOSPITAL | Age: 71
End: 2021-12-03
Payer: MEDICARE

## 2021-12-06 DIAGNOSIS — R19.5 POSITIVE COLORECTAL CANCER SCREENING USING COLOGUARD TEST: Primary | ICD-10-CM

## 2021-12-21 ENCOUNTER — TELEPHONE (OUTPATIENT)
Dept: PRIMARY CARE CLINIC | Facility: CLINIC | Age: 71
End: 2021-12-21

## 2021-12-21 NOTE — TELEPHONE ENCOUNTER
Returned patient call regarding results of Cologaurd from 2019, patient states she just had a colonoscopy about 12 months prior and 2 polyps were removed. Patient does not wish to do any further colonoscopies at this point due to age and not wanting to drink the bowel prep. Patient is aware of her appt on 01/31/2022. Please  Advise.

## 2021-12-21 NOTE — TELEPHONE ENCOUNTER
----- Message from Essie Collazo sent at 12/21/2021  9:11 AM CST -----  Regarding: returning call  Contact: Pt  I haven't called her in months.  Did one of you call her?    ----- Message -----  From: Amina De Leon  Sent: 12/21/2021   9:07 AM CST  To: Justin Segovia Staff    .Type:  Patient Returning Call    Who Called: Deepali   Who Left Message for Patient: Maria Del Rosario   Does the patient know what this is regarding?: missed call   Would the patient rather a call back or a response via [facility]?  Callback   Best Call Back Number: .[phone] (home)     Additional Information:

## 2021-12-28 ENCOUNTER — TELEPHONE (OUTPATIENT)
Dept: PRIMARY CARE CLINIC | Facility: CLINIC | Age: 71
End: 2021-12-28
Payer: MEDICARE

## 2022-01-20 ENCOUNTER — PATIENT OUTREACH (OUTPATIENT)
Dept: ADMINISTRATIVE | Facility: HOSPITAL | Age: 72
End: 2022-01-20
Payer: MEDICARE

## 2022-01-20 NOTE — PROGRESS NOTES
[facility] Mammogram Report: Chart reviewed for overdue mammogram. [facility] found [facility] or Care Everywhere. Called patient no answer left a message to call and schedule mammogram and bone density.

## 2022-02-25 ENCOUNTER — TELEPHONE (OUTPATIENT)
Dept: PRIMARY CARE CLINIC | Facility: CLINIC | Age: 72
End: 2022-02-25
Payer: MEDICARE

## 2022-02-25 NOTE — TELEPHONE ENCOUNTER
"Jan 28, 2022 pt started having symptoms of "out of body experience", weak and just not feeling well....and was just out that entire week not feeling well..   pt stated she went to Urgent care in [place] 2/14/2022 and was diagnosed w/ UTI... pt was given Nitrofurantoin and after taking abx the next day pt couldn't walk, no appeitie and very weak.  Pt stated she had to walk w/a cane because she couldn't move around.  Pt stated its getting better and today was the first day of wearing a shoe because the top of feet and ankles were hurting so bad.   Pt have been out of work since Friday 1-.. and asking for a excuse from 1-28-22 thru to go back to work on Monday... pt will come in on wed at 9am for visit      "

## 2022-03-02 ENCOUNTER — OFFICE VISIT (OUTPATIENT)
Dept: PRIMARY CARE CLINIC | Facility: CLINIC | Age: 72
End: 2022-03-02
Payer: MEDICARE

## 2022-03-02 VITALS
DIASTOLIC BLOOD PRESSURE: 75 MMHG | OXYGEN SATURATION: 96 % | SYSTOLIC BLOOD PRESSURE: 112 MMHG | TEMPERATURE: 98 F | RESPIRATION RATE: 16 BRPM | WEIGHT: 157 LBS | BODY MASS INDEX: 23.79 KG/M2 | HEART RATE: 79 BPM | HEIGHT: 68 IN

## 2022-03-02 DIAGNOSIS — M81.0 AGE-RELATED OSTEOPOROSIS WITHOUT CURRENT PATHOLOGICAL FRACTURE: ICD-10-CM

## 2022-03-02 DIAGNOSIS — Z12.31 BREAST CANCER SCREENING BY MAMMOGRAM: ICD-10-CM

## 2022-03-02 DIAGNOSIS — E11.9 TYPE 2 DIABETES MELLITUS WITHOUT COMPLICATION, WITHOUT LONG-TERM CURRENT USE OF INSULIN: Primary | ICD-10-CM

## 2022-03-02 DIAGNOSIS — E21.3 HYPERPARATHYROIDISM: ICD-10-CM

## 2022-03-02 DIAGNOSIS — Z86.718 HISTORY OF DVT (DEEP VEIN THROMBOSIS): ICD-10-CM

## 2022-03-02 PROCEDURE — 3074F PR MOST RECENT SYSTOLIC BLOOD PRESSURE < 130 MM HG: ICD-10-PCS | Mod: CPTII,S$GLB,, | Performed by: INTERNAL MEDICINE

## 2022-03-02 PROCEDURE — 3052F HG A1C>EQUAL 8.0%<EQUAL 9.0%: CPT | Mod: CPTII,S$GLB,, | Performed by: INTERNAL MEDICINE

## 2022-03-02 PROCEDURE — 1160F RVW MEDS BY RX/DR IN RCRD: CPT | Mod: CPTII,S$GLB,, | Performed by: INTERNAL MEDICINE

## 2022-03-02 PROCEDURE — 3074F SYST BP LT 130 MM HG: CPT | Mod: CPTII,S$GLB,, | Performed by: INTERNAL MEDICINE

## 2022-03-02 PROCEDURE — 1159F MED LIST DOCD IN RCRD: CPT | Mod: CPTII,S$GLB,, | Performed by: INTERNAL MEDICINE

## 2022-03-02 PROCEDURE — 3008F PR BODY MASS INDEX (BMI) DOCUMENTED: ICD-10-PCS | Mod: CPTII,S$GLB,, | Performed by: INTERNAL MEDICINE

## 2022-03-02 PROCEDURE — 99213 OFFICE O/P EST LOW 20 MIN: CPT | Mod: S$GLB,,, | Performed by: INTERNAL MEDICINE

## 2022-03-02 PROCEDURE — 1159F PR MEDICATION LIST DOCUMENTED IN MEDICAL RECORD: ICD-10-PCS | Mod: CPTII,S$GLB,, | Performed by: INTERNAL MEDICINE

## 2022-03-02 PROCEDURE — 3078F PR MOST RECENT DIASTOLIC BLOOD PRESSURE < 80 MM HG: ICD-10-PCS | Mod: CPTII,S$GLB,, | Performed by: INTERNAL MEDICINE

## 2022-03-02 PROCEDURE — 99213 PR OFFICE/OUTPT VISIT, EST, LEVL III, 20-29 MIN: ICD-10-PCS | Mod: S$GLB,,, | Performed by: INTERNAL MEDICINE

## 2022-03-02 PROCEDURE — 1160F PR REVIEW ALL MEDS BY PRESCRIBER/CLIN PHARMACIST DOCUMENTED: ICD-10-PCS | Mod: CPTII,S$GLB,, | Performed by: INTERNAL MEDICINE

## 2022-03-02 PROCEDURE — 3078F DIAST BP <80 MM HG: CPT | Mod: CPTII,S$GLB,, | Performed by: INTERNAL MEDICINE

## 2022-03-02 PROCEDURE — 3008F BODY MASS INDEX DOCD: CPT | Mod: CPTII,S$GLB,, | Performed by: INTERNAL MEDICINE

## 2022-03-02 PROCEDURE — 3052F PR MOST RECENT HEMOGLOBIN A1C LEVEL 8.0 - < 9.0%: ICD-10-PCS | Mod: CPTII,S$GLB,, | Performed by: INTERNAL MEDICINE

## 2022-03-02 NOTE — PROGRESS NOTES
Subjective:      Patient ID: Deepali Madrid is a 71 y.o. female.    Chief Complaint: Follow-up (F/u,)    Patient with h/o DM. Patient last HbA1c was 8.3 improved from 8.7.  Patient is not able to check blood sugar regularly. Patient denies polyuria, polydipsia, numbness in hands or feet.  Patient has able to take Trulicity. Patient is not adherent to diet. Patient BP are better controlled and is taking Lisnopril 10mg for renal protection. . NO CP, SOB, edema.     Patient is being followed by Endocrinology for Hyper-parathyroidism. Patient also has Osteoporosis and is on Actonel.     Patient recently went to urgent care secondary to fatigue lethargy and lack of appetite.  Patient was diagnosed to have urinary tract infection.  Patient was treated with nitrofurantoin.  Patient reports feeling better but reports has lost lot of weight.  Fatigue is much improved than before.        Review of Systems   Constitutional: Negative for chills, diaphoresis, fever, malaise/fatigue and weight loss (10 lbs weight loss).   HENT: Negative for congestion, ear pain, sinus pain, sore throat and tinnitus.    Eyes: Negative for blurred vision and photophobia.   Respiratory: Negative for cough, hemoptysis, shortness of breath and wheezing.    Cardiovascular: Negative for chest pain, palpitations, orthopnea, leg swelling and PND.   Gastrointestinal: Negative for abdominal pain, blood in stool, constipation, diarrhea, heartburn, melena, nausea and vomiting.   Genitourinary: Negative for dysuria, frequency and urgency.   Musculoskeletal: Negative for back pain, myalgias and neck pain.   Skin: Negative for rash.   Neurological: Negative for dizziness, tremors, seizures, loss of consciousness and weakness.   Endo/Heme/Allergies: Negative for polydipsia.   Psychiatric/Behavioral: Negative for depression and hallucinations. The patient does not have insomnia.      Objective:     Physical Exam  Constitutional:       General: She is not in acute  distress.     Appearance: She is not diaphoretic.   HENT:      Mouth/Throat:      Comments: Throat is clear with no erythema.   Neck:      Thyroid: No thyromegaly.   Cardiovascular:      Rate and Rhythm: Normal rate and regular rhythm.      Heart sounds: Normal heart sounds. No murmur heard.  Pulmonary:      Effort: Pulmonary effort is normal. No respiratory distress.      Breath sounds: Normal breath sounds. No wheezing.   Abdominal:      General: Bowel sounds are normal. There is no distension.      Palpations: Abdomen is soft.      Tenderness: There is no abdominal tenderness.   Lymphadenopathy:      Cervical: No cervical adenopathy.   Neurological:      Mental Status: She is alert and oriented to person, place, and time.   Psychiatric:         Behavior: Behavior normal.         Thought Content: Thought content normal.         Judgment: Judgment normal.       Assessment:     1. Type 2 diabetes mellitus without complication, without long-term current use of insulin    2. Breast cancer screening by mammogram    3. Age-related osteoporosis without current pathological fracture    4. Hyperparathyroidism    5. History of DVT (deep vein thrombosis)       Plan:     Patient last A1c of 8.4 is not at goal..  Patient home blood sugars seems to be running high  Will repeat A1c and if still high will add Januvia.  Patient is Actonel for osteoporosis + Will repeat DEXA scan  Advised patient to take meds as prescribed  Will order mammogram.  Patient has history of DVT and underwent anticoagulation.  Patient has heterozygous factor 5 laden mutation.  Is not on anticoagulation.  Patient is being followed by endocrinologist for hyperparathyroidism

## 2022-03-03 DIAGNOSIS — E11.9 TYPE 2 DIABETES MELLITUS WITHOUT COMPLICATION, WITHOUT LONG-TERM CURRENT USE OF INSULIN: Primary | ICD-10-CM

## 2022-03-03 LAB
ALBUMIN SERPL-MCNC: 3.6 G/DL (ref 3.6–5.1)
ALBUMIN/CREAT UR: 7 MCG/MG CREAT
ALBUMIN/GLOB SERPL: 1.1 (CALC) (ref 1–2.5)
ALP SERPL-CCNC: 75 U/L (ref 37–153)
ALT SERPL-CCNC: 14 U/L (ref 6–29)
AST SERPL-CCNC: 33 U/L (ref 10–35)
BASOPHILS # BLD AUTO: 23 CELLS/UL (ref 0–200)
BASOPHILS NFR BLD AUTO: 0.4 %
BILIRUB SERPL-MCNC: 0.8 MG/DL (ref 0.2–1.2)
BUN SERPL-MCNC: 8 MG/DL (ref 7–25)
BUN/CREAT SERPL: ABNORMAL (CALC) (ref 6–22)
CALCIUM SERPL-MCNC: 10.9 MG/DL (ref 8.6–10.4)
CHLORIDE SERPL-SCNC: 101 MMOL/L (ref 98–110)
CHOLEST SERPL-MCNC: 128 MG/DL
CHOLEST/HDLC SERPL: 4.1 (CALC)
CO2 SERPL-SCNC: 26 MMOL/L (ref 20–32)
CREAT SERPL-MCNC: 0.64 MG/DL (ref 0.6–0.93)
CREAT UR-MCNC: 211 MG/DL (ref 20–275)
EOSINOPHIL # BLD AUTO: 40 CELLS/UL (ref 15–500)
EOSINOPHIL NFR BLD AUTO: 0.7 %
ERYTHROCYTE [DISTWIDTH] IN BLOOD BY AUTOMATED COUNT: 13.4 % (ref 11–15)
GLOBULIN SER CALC-MCNC: 3.3 G/DL (CALC) (ref 1.9–3.7)
GLUCOSE SERPL-MCNC: 226 MG/DL (ref 65–99)
HBA1C MFR BLD: 9.2 % OF TOTAL HGB
HCT VFR BLD AUTO: 37 % (ref 35–45)
HDLC SERPL-MCNC: 31 MG/DL
HGB BLD-MCNC: 11.8 G/DL (ref 11.7–15.5)
LDLC SERPL CALC-MCNC: 73 MG/DL (CALC)
LYMPHOCYTES # BLD AUTO: 2394 CELLS/UL (ref 850–3900)
LYMPHOCYTES NFR BLD AUTO: 42 %
MCH RBC QN AUTO: 29.1 PG (ref 27–33)
MCHC RBC AUTO-ENTMCNC: 31.9 G/DL (ref 32–36)
MCV RBC AUTO: 91.1 FL (ref 80–100)
MICROALBUMIN UR-MCNC: 1.4 MG/DL
MONOCYTES # BLD AUTO: 393 CELLS/UL (ref 200–950)
MONOCYTES NFR BLD AUTO: 6.9 %
NEUTROPHILS # BLD AUTO: 2850 CELLS/UL (ref 1500–7800)
NEUTROPHILS NFR BLD AUTO: 50 %
NONHDLC SERPL-MCNC: 97 MG/DL (CALC)
PLATELET # BLD AUTO: 213 THOUSAND/UL (ref 140–400)
PMV BLD REES-ECKER: 8.3 FL (ref 7.5–12.5)
POTASSIUM SERPL-SCNC: 4.7 MMOL/L (ref 3.5–5.3)
PROT SERPL-MCNC: 6.9 G/DL (ref 6.1–8.1)
RBC # BLD AUTO: 4.06 MILLION/UL (ref 3.8–5.1)
SODIUM SERPL-SCNC: 136 MMOL/L (ref 135–146)
TRIGL SERPL-MCNC: 163 MG/DL
TSH SERPL-ACNC: 2.06 MIU/L (ref 0.4–4.5)
WBC # BLD AUTO: 5.7 THOUSAND/UL (ref 3.8–10.8)

## 2022-03-18 DIAGNOSIS — E11.9 TYPE 2 DIABETES MELLITUS WITHOUT COMPLICATION, WITHOUT LONG-TERM CURRENT USE OF INSULIN: ICD-10-CM

## 2022-03-18 RX ORDER — DULAGLUTIDE 1.5 MG/.5ML
1.5 INJECTION, SOLUTION SUBCUTANEOUS WEEKLY
Qty: 4 PEN | Refills: 11 | Status: SHIPPED | OUTPATIENT
Start: 2022-03-18 | End: 2022-04-18

## 2022-03-18 NOTE — TELEPHONE ENCOUNTER
----- Message from Aminagabrielle De Leon sent at 3/18/2022 12:20 PM CDT -----  Contact: PT  .Type:  RX Refill Request    Who Called:  Deepali    Refill or New Rx: refill    RX Name and Strength: TRULICITY 1.5 mg/0.5 mL pen injector    How is the patient currently taking it? (ex. 1XDay):   Is this a 30 day or 90 day RX: 90   Preferred Pharmacy with phone number: [facility] - [address] [address]  Phone: [phone] Fax: [phone]        Local or Mail Order: local   Ordering Provider: Justin    Would the patient rather a call back or a response via [facility]?  Callback    Best Call Back Number: .[phone] (home)      Additional Information:  insurance wont cover RX  anymore

## 2022-03-22 ENCOUNTER — TELEPHONE (OUTPATIENT)
Dept: PRIMARY CARE CLINIC | Facility: CLINIC | Age: 72
End: 2022-03-22

## 2022-03-22 NOTE — TELEPHONE ENCOUNTER
----- Message from Oly Sharif sent at 3/22/2022  1:27 PM CDT -----  Deepali Madrid would like for the nurse to give her a call back as soon as possible. She did not say what its regarding but she did say its urgent and really important [phone] (home)

## 2022-03-22 NOTE — TELEPHONE ENCOUNTER
S/w patient, she is having trouble refilling her trulicity. I s/w pharmacy and her PA is due now. PA was started and is pending insurance review.

## 2022-03-24 ENCOUNTER — TELEPHONE (OUTPATIENT)
Dept: PRIMARY CARE CLINIC | Facility: CLINIC | Age: 72
End: 2022-03-24

## 2022-03-24 NOTE — TELEPHONE ENCOUNTER
----- Message from Yuli Mon sent at 3/24/2022 10:04 AM CDT -----  Regarding: Medication  Contact: Patient  Patient would like a call back  concerning trouble she is having getting one of her medications at [facility] .[phone] (home) Patient states it's urgent she is out of medication.

## 2022-03-24 NOTE — TELEPHONE ENCOUNTER
Returned call to patient, advised that PA was approved and to contact the pharmacy to check status.  Trulicity: PA Case: [id number], Status: Approved, Coverage Starts on: 1/1/2022 12:00:00 AM, Coverage Ends on: 12/31/2022 12:00:00 AM. Questions? Contact [phone].

## 2022-03-31 ENCOUNTER — PATIENT OUTREACH (OUTPATIENT)
Dept: ADMINISTRATIVE | Facility: HOSPITAL | Age: 72
End: 2022-03-31
Payer: MEDICARE

## 2022-04-01 LAB
LEFT EYE DM RETINOPATHY: POSITIVE
RIGHT EYE DM RETINOPATHY: POSITIVE

## 2022-04-04 ENCOUNTER — OFFICE VISIT (OUTPATIENT)
Dept: PRIMARY CARE CLINIC | Facility: CLINIC | Age: 72
End: 2022-04-04
Payer: MEDICARE

## 2022-04-04 ENCOUNTER — PATIENT OUTREACH (OUTPATIENT)
Dept: ADMINISTRATIVE | Facility: HOSPITAL | Age: 72
End: 2022-04-04
Payer: MEDICARE

## 2022-04-04 VITALS
DIASTOLIC BLOOD PRESSURE: 75 MMHG | HEART RATE: 64 BPM | TEMPERATURE: 98 F | SYSTOLIC BLOOD PRESSURE: 112 MMHG | RESPIRATION RATE: 16 BRPM | BODY MASS INDEX: 23.95 KG/M2 | OXYGEN SATURATION: 97 % | HEIGHT: 68 IN | WEIGHT: 158 LBS

## 2022-04-04 DIAGNOSIS — E11.9 TYPE 2 DIABETES MELLITUS WITHOUT COMPLICATION, WITHOUT LONG-TERM CURRENT USE OF INSULIN: Primary | ICD-10-CM

## 2022-04-04 PROBLEM — D68.51 FACTOR V LEIDEN MUTATION: Status: ACTIVE | Noted: 2022-04-04

## 2022-04-04 PROCEDURE — 99213 PR OFFICE/OUTPT VISIT, EST, LEVL III, 20-29 MIN: ICD-10-PCS | Mod: S$GLB,,, | Performed by: INTERNAL MEDICINE

## 2022-04-04 PROCEDURE — 3078F DIAST BP <80 MM HG: CPT | Mod: CPTII,S$GLB,, | Performed by: INTERNAL MEDICINE

## 2022-04-04 PROCEDURE — 3066F PR DOCUMENTATION OF TREATMENT FOR NEPHROPATHY: ICD-10-PCS | Mod: CPTII,S$GLB,, | Performed by: INTERNAL MEDICINE

## 2022-04-04 PROCEDURE — 3061F NEG MICROALBUMINURIA REV: CPT | Mod: CPTII,S$GLB,, | Performed by: INTERNAL MEDICINE

## 2022-04-04 PROCEDURE — 3074F PR MOST RECENT SYSTOLIC BLOOD PRESSURE < 130 MM HG: ICD-10-PCS | Mod: CPTII,S$GLB,, | Performed by: INTERNAL MEDICINE

## 2022-04-04 PROCEDURE — 3061F PR NEG MICROALBUMINURIA RESULT DOCUMENTED/REVIEW: ICD-10-PCS | Mod: CPTII,S$GLB,, | Performed by: INTERNAL MEDICINE

## 2022-04-04 PROCEDURE — 1160F PR REVIEW ALL MEDS BY PRESCRIBER/CLIN PHARMACIST DOCUMENTED: ICD-10-PCS | Mod: CPTII,S$GLB,, | Performed by: INTERNAL MEDICINE

## 2022-04-04 PROCEDURE — 3046F PR MOST RECENT HEMOGLOBIN A1C LEVEL > 9.0%: ICD-10-PCS | Mod: CPTII,S$GLB,, | Performed by: INTERNAL MEDICINE

## 2022-04-04 PROCEDURE — 1160F RVW MEDS BY RX/DR IN RCRD: CPT | Mod: CPTII,S$GLB,, | Performed by: INTERNAL MEDICINE

## 2022-04-04 PROCEDURE — 3008F BODY MASS INDEX DOCD: CPT | Mod: CPTII,S$GLB,, | Performed by: INTERNAL MEDICINE

## 2022-04-04 PROCEDURE — 1159F MED LIST DOCD IN RCRD: CPT | Mod: CPTII,S$GLB,, | Performed by: INTERNAL MEDICINE

## 2022-04-04 PROCEDURE — 3008F PR BODY MASS INDEX (BMI) DOCUMENTED: ICD-10-PCS | Mod: CPTII,S$GLB,, | Performed by: INTERNAL MEDICINE

## 2022-04-04 PROCEDURE — 1159F PR MEDICATION LIST DOCUMENTED IN MEDICAL RECORD: ICD-10-PCS | Mod: CPTII,S$GLB,, | Performed by: INTERNAL MEDICINE

## 2022-04-04 PROCEDURE — 3066F NEPHROPATHY DOC TX: CPT | Mod: CPTII,S$GLB,, | Performed by: INTERNAL MEDICINE

## 2022-04-04 PROCEDURE — 99213 OFFICE O/P EST LOW 20 MIN: CPT | Mod: S$GLB,,, | Performed by: INTERNAL MEDICINE

## 2022-04-04 PROCEDURE — 3074F SYST BP LT 130 MM HG: CPT | Mod: CPTII,S$GLB,, | Performed by: INTERNAL MEDICINE

## 2022-04-04 PROCEDURE — 3078F PR MOST RECENT DIASTOLIC BLOOD PRESSURE < 80 MM HG: ICD-10-PCS | Mod: CPTII,S$GLB,, | Performed by: INTERNAL MEDICINE

## 2022-04-04 PROCEDURE — 3046F HEMOGLOBIN A1C LEVEL >9.0%: CPT | Mod: CPTII,S$GLB,, | Performed by: INTERNAL MEDICINE

## 2022-04-04 NOTE — PROGRESS NOTES
Subjective:      Patient ID: Deepali Madrid is a 71 y.o. female.    Chief Complaint: Diabetes (1 month f/u )    Patient with h/o DM. Patient last HbA1c was 9.2 worsen from 8.2.  Patient is not able to check blood sugar regularly. Patient denies polyuria, polydipsia, numbness in hands or feet.  Patient was not able to take Trulicity. But now reports adherence with Glimperide, Januvia anad Trulicity Patient is more adherent to diet. Patient BP are better controlled and is taking Lisnopril 10mg for renal protection. . NO CP, SOB, edema.     Patient is being followed by Endocrinology for Hyper-parathyroidism. Patient also has Osteoporosis and is on Actonel.      Review of Systems   Constitutional: Negative for chills, diaphoresis, fever, malaise/fatigue and weight loss.   HENT: Negative for congestion, ear pain, sinus pain, sore throat and tinnitus.    Eyes: Negative for blurred vision and photophobia.   Respiratory: Negative for cough, hemoptysis, shortness of breath and wheezing.    Cardiovascular: Negative for chest pain, palpitations, orthopnea, leg swelling and PND.   Gastrointestinal: Negative for abdominal pain, blood in stool, constipation, diarrhea, heartburn, melena, nausea and vomiting.   Genitourinary: Negative for dysuria, frequency and urgency.   Musculoskeletal: Negative for back pain, myalgias and neck pain.   Skin: Negative for rash.   Neurological: Negative for dizziness, tremors, seizures, loss of consciousness and weakness.   Endo/Heme/Allergies: Negative for polydipsia.   Psychiatric/Behavioral: Negative for depression and hallucinations. The patient does not have insomnia.      Objective:     Physical Exam  Constitutional:       General: She is not in acute distress.     Appearance: She is not diaphoretic.   Neck:      Thyroid: No thyromegaly.   Cardiovascular:      Rate and Rhythm: Normal rate and regular rhythm.      Heart sounds: Normal heart sounds. No murmur heard.  Pulmonary:      Effort:  Pulmonary effort is normal. No respiratory distress.      Breath sounds: Normal breath sounds. No wheezing.   Abdominal:      General: Bowel sounds are normal. There is no distension.      Palpations: Abdomen is soft.      Tenderness: There is no abdominal tenderness.   Lymphadenopathy:      Cervical: No cervical adenopathy.   Neurological:      Mental Status: She is alert and oriented to person, place, and time.   Psychiatric:         Behavior: Behavior normal.         Thought Content: Thought content normal.         Judgment: Judgment normal.       Assessment:     1. Type 2 diabetes mellitus without complication, without long-term current use of insulin       Plan:     Patient last A1c of 9.2 has worsen from 8.4..  Patient is more adherent to diet and medication and blood sugars seem better control.  Will place CGM to better understand patient blood sugar pattern  Patient blood pressures are under good control.  Will continue medication  Patient declined COVID, shingltyrell, flu vaccine  Advised patient to get mammogram and DEXA scan done  [facility]

## 2022-04-04 NOTE — LETTER
AUTHORIZATION FOR RELEASE OF   CONFIDENTIAL INFORMATION    [facility]   We are seeing Deepali Madrid, date of birth 1950, in the clinic at [facility]. Luca Anderson MD is the patient's PCP. Deepali Madrid has an outstanding lab/procedure at the time we reviewed her chart. In order to help keep her health information updated, she has authorized us to request the following medical record(s):        (  )  MAMMOGRAM                                      (  )  COLONOSCOPY      (  )  PAP SMEAR                                          (  )  OUTSIDE LAB RESULTS     (  )  DEXA SCAN                                          (XX  )  EYE EXAM            (  )  FOOT EXAM                                          (  )  ENTIRE RECORD     (  )  OUTSIDE IMMUNIZATIONS                 (  )  _______________         Please fax records to [facility], [phone]     If you have any questions, please contact Giovana NUR [facility] [phone]          Patient Name: Deepali Madrid  : 1950  Patient Phone #: [phone]

## 2022-04-07 ENCOUNTER — PATIENT OUTREACH (OUTPATIENT)
Dept: ADMINISTRATIVE | Facility: HOSPITAL | Age: 72
End: 2022-04-07
Payer: MEDICARE

## 2022-04-18 ENCOUNTER — OFFICE VISIT (OUTPATIENT)
Dept: PRIMARY CARE CLINIC | Facility: CLINIC | Age: 72
End: 2022-04-18
Payer: MEDICARE

## 2022-04-18 VITALS
BODY MASS INDEX: 23.44 KG/M2 | DIASTOLIC BLOOD PRESSURE: 80 MMHG | HEIGHT: 68 IN | HEART RATE: 70 BPM | SYSTOLIC BLOOD PRESSURE: 121 MMHG | OXYGEN SATURATION: 95 % | TEMPERATURE: 97 F | WEIGHT: 154.63 LBS | RESPIRATION RATE: 16 BRPM

## 2022-04-18 DIAGNOSIS — E11.9 TYPE 2 DIABETES MELLITUS WITHOUT COMPLICATION, WITHOUT LONG-TERM CURRENT USE OF INSULIN: Primary | ICD-10-CM

## 2022-04-18 PROCEDURE — 3008F PR BODY MASS INDEX (BMI) DOCUMENTED: ICD-10-PCS | Mod: CPTII,S$GLB,, | Performed by: INTERNAL MEDICINE

## 2022-04-18 PROCEDURE — 95251 PR GLUCOSE MONITOR, 72 HOUR, PHYS INTERP: ICD-10-PCS | Mod: S$GLB,,, | Performed by: INTERNAL MEDICINE

## 2022-04-18 PROCEDURE — 3008F BODY MASS INDEX DOCD: CPT | Mod: CPTII,S$GLB,, | Performed by: INTERNAL MEDICINE

## 2022-04-18 PROCEDURE — 3079F DIAST BP 80-89 MM HG: CPT | Mod: CPTII,S$GLB,, | Performed by: INTERNAL MEDICINE

## 2022-04-18 PROCEDURE — 3046F HEMOGLOBIN A1C LEVEL >9.0%: CPT | Mod: CPTII,S$GLB,, | Performed by: INTERNAL MEDICINE

## 2022-04-18 PROCEDURE — 3079F PR MOST RECENT DIASTOLIC BLOOD PRESSURE 80-89 MM HG: ICD-10-PCS | Mod: CPTII,S$GLB,, | Performed by: INTERNAL MEDICINE

## 2022-04-18 PROCEDURE — 3066F NEPHROPATHY DOC TX: CPT | Mod: CPTII,S$GLB,, | Performed by: INTERNAL MEDICINE

## 2022-04-18 PROCEDURE — 99213 PR OFFICE/OUTPT VISIT, EST, LEVL III, 20-29 MIN: ICD-10-PCS | Mod: S$GLB,,, | Performed by: INTERNAL MEDICINE

## 2022-04-18 PROCEDURE — 1159F PR MEDICATION LIST DOCUMENTED IN MEDICAL RECORD: ICD-10-PCS | Mod: CPTII,S$GLB,, | Performed by: INTERNAL MEDICINE

## 2022-04-18 PROCEDURE — 95251 CONT GLUC MNTR ANALYSIS I&R: CPT | Mod: S$GLB,,, | Performed by: INTERNAL MEDICINE

## 2022-04-18 PROCEDURE — 3074F SYST BP LT 130 MM HG: CPT | Mod: CPTII,S$GLB,, | Performed by: INTERNAL MEDICINE

## 2022-04-18 PROCEDURE — 1160F PR REVIEW ALL MEDS BY PRESCRIBER/CLIN PHARMACIST DOCUMENTED: ICD-10-PCS | Mod: CPTII,S$GLB,, | Performed by: INTERNAL MEDICINE

## 2022-04-18 PROCEDURE — 3061F PR NEG MICROALBUMINURIA RESULT DOCUMENTED/REVIEW: ICD-10-PCS | Mod: CPTII,S$GLB,, | Performed by: INTERNAL MEDICINE

## 2022-04-18 PROCEDURE — 99213 OFFICE O/P EST LOW 20 MIN: CPT | Mod: S$GLB,,, | Performed by: INTERNAL MEDICINE

## 2022-04-18 PROCEDURE — 3046F PR MOST RECENT HEMOGLOBIN A1C LEVEL > 9.0%: ICD-10-PCS | Mod: CPTII,S$GLB,, | Performed by: INTERNAL MEDICINE

## 2022-04-18 PROCEDURE — 3061F NEG MICROALBUMINURIA REV: CPT | Mod: CPTII,S$GLB,, | Performed by: INTERNAL MEDICINE

## 2022-04-18 PROCEDURE — 3066F PR DOCUMENTATION OF TREATMENT FOR NEPHROPATHY: ICD-10-PCS | Mod: CPTII,S$GLB,, | Performed by: INTERNAL MEDICINE

## 2022-04-18 PROCEDURE — 1160F RVW MEDS BY RX/DR IN RCRD: CPT | Mod: CPTII,S$GLB,, | Performed by: INTERNAL MEDICINE

## 2022-04-18 PROCEDURE — 1159F MED LIST DOCD IN RCRD: CPT | Mod: CPTII,S$GLB,, | Performed by: INTERNAL MEDICINE

## 2022-04-18 PROCEDURE — 3074F PR MOST RECENT SYSTOLIC BLOOD PRESSURE < 130 MM HG: ICD-10-PCS | Mod: CPTII,S$GLB,, | Performed by: INTERNAL MEDICINE

## 2022-04-18 RX ORDER — DULAGLUTIDE 3 MG/.5ML
3 INJECTION, SOLUTION SUBCUTANEOUS
Qty: 4 PEN | Refills: 11 | Status: SHIPPED | OUTPATIENT
Start: 2022-04-18 | End: 2023-04-18

## 2022-04-18 NOTE — PROGRESS NOTES
Subjective:      Patient ID: Deepali Madrid is a 71 y.o. female.    Chief Complaint: Follow-up (CGM removal)    Patient with h/o DM. Patient last HbA1c was 9.2 worsen from 8.2.  Patient is not able to check blood sugar regularly. Patient denies polyuria, polydipsia, numbness in hands or feet.  Restarted on Trulicity and reports tolerating it well.  Patient is also on glimepiride Januvia.  Because of elevated A1c CGM was placed.  The downloaded data suggest that patient blood sugars are running in good range 60% of the time but is running high 35% of the time and very high 6% of the time.  Patient average blood sugar is 176 Will standard deviation of 43. A sharp rise in blood sugar is noted at about noon and after that blood sugars stay high > 180 till about midnight blood sugar from midnight to 11:00 a.m. are under okay control running between 100-180.     Patient is being followed by Endocrinology for Hyper-parathyroidism. Patient also has Osteoporosis and is on Actonel.      Review of Systems   Constitutional: Negative for chills, diaphoresis, fever, malaise/fatigue and weight loss.   HENT: Negative for congestion, ear pain, sinus pain, sore throat and tinnitus.    Eyes: Negative for blurred vision and photophobia.   Respiratory: Negative for cough, hemoptysis, shortness of breath and wheezing.    Cardiovascular: Negative for chest pain, palpitations, orthopnea, leg swelling and PND.   Gastrointestinal: Negative for abdominal pain, blood in stool, constipation, diarrhea, heartburn, melena, nausea and vomiting.   Genitourinary: Negative for dysuria, frequency and urgency.   Musculoskeletal: Negative for back pain, myalgias and neck pain.   Skin: Negative for rash.   Neurological: Negative for dizziness, tremors, seizures, loss of consciousness and weakness.   Endo/Heme/Allergies: Negative for polydipsia.   Psychiatric/Behavioral: Negative for depression and hallucinations. The patient does not have insomnia.       Objective:     Physical Exam  Constitutional:       General: She is not in acute distress.     Appearance: She is not diaphoretic.   Neck:      Thyroid: No thyromegaly.   Cardiovascular:      Rate and Rhythm: Normal rate and regular rhythm.      Heart sounds: Normal heart sounds. No murmur heard.  Pulmonary:      Effort: Pulmonary effort is normal. No respiratory distress.      Breath sounds: Normal breath sounds. No wheezing.   Abdominal:      General: Bowel sounds are normal. There is no distension.      Palpations: Abdomen is soft.      Tenderness: There is no abdominal tenderness.   Lymphadenopathy:      Cervical: No cervical adenopathy.   Neurological:      Mental Status: She is alert and oriented to person, place, and time.   Psychiatric:         Behavior: Behavior normal.         Thought Content: Thought content normal.         Judgment: Judgment normal.       Assessment:     1. Type 2 diabetes mellitus without complication, without long-term current use of insulin       Plan:     Patient last A1c of 9.2 has worsen from 8.4.  CGM was placed and it appears that blood sugars are running high after 9 a.m/after breakfast  A prominent spike in blood sugar is seen after lunch  Will increase Trulicity  to 3 mg to better control blood sugar  Advised patient to monitor blood sugars at home and bring log  Have the blood sugar still staying high considering increasing glimepiride 4 mg

## 2022-07-18 ENCOUNTER — OFFICE VISIT (OUTPATIENT)
Dept: PRIMARY CARE CLINIC | Facility: CLINIC | Age: 72
End: 2022-07-18
Payer: MEDICARE

## 2022-07-18 VITALS
HEIGHT: 68 IN | RESPIRATION RATE: 16 BRPM | SYSTOLIC BLOOD PRESSURE: 147 MMHG | BODY MASS INDEX: 23.79 KG/M2 | DIASTOLIC BLOOD PRESSURE: 89 MMHG | OXYGEN SATURATION: 97 % | HEART RATE: 66 BPM | WEIGHT: 157 LBS | TEMPERATURE: 97 F

## 2022-07-18 DIAGNOSIS — I82.532 CHRONIC DEEP VEIN THROMBOSIS (DVT) OF POPLITEAL VEIN OF LEFT LOWER EXTREMITY: ICD-10-CM

## 2022-07-18 DIAGNOSIS — I10 PRIMARY HYPERTENSION: ICD-10-CM

## 2022-07-18 DIAGNOSIS — D68.51 FACTOR V LEIDEN MUTATION: ICD-10-CM

## 2022-07-18 DIAGNOSIS — M81.0 AGE-RELATED OSTEOPOROSIS WITHOUT CURRENT PATHOLOGICAL FRACTURE: ICD-10-CM

## 2022-07-18 DIAGNOSIS — E11.9 TYPE 2 DIABETES MELLITUS WITHOUT COMPLICATION, WITHOUT LONG-TERM CURRENT USE OF INSULIN: Primary | ICD-10-CM

## 2022-07-18 DIAGNOSIS — E21.3 HYPERPARATHYROIDISM: ICD-10-CM

## 2022-07-18 PROCEDURE — 1160F PR REVIEW ALL MEDS BY PRESCRIBER/CLIN PHARMACIST DOCUMENTED: ICD-10-PCS | Mod: CPTII,S$GLB,, | Performed by: INTERNAL MEDICINE

## 2022-07-18 PROCEDURE — 3077F PR MOST RECENT SYSTOLIC BLOOD PRESSURE >= 140 MM HG: ICD-10-PCS | Mod: CPTII,S$GLB,, | Performed by: INTERNAL MEDICINE

## 2022-07-18 PROCEDURE — 3079F DIAST BP 80-89 MM HG: CPT | Mod: CPTII,S$GLB,, | Performed by: INTERNAL MEDICINE

## 2022-07-18 PROCEDURE — 3046F HEMOGLOBIN A1C LEVEL >9.0%: CPT | Mod: CPTII,S$GLB,, | Performed by: INTERNAL MEDICINE

## 2022-07-18 PROCEDURE — 99214 OFFICE O/P EST MOD 30 MIN: CPT | Mod: S$GLB,,, | Performed by: INTERNAL MEDICINE

## 2022-07-18 PROCEDURE — 3061F NEG MICROALBUMINURIA REV: CPT | Mod: CPTII,S$GLB,, | Performed by: INTERNAL MEDICINE

## 2022-07-18 PROCEDURE — 3061F PR NEG MICROALBUMINURIA RESULT DOCUMENTED/REVIEW: ICD-10-PCS | Mod: CPTII,S$GLB,, | Performed by: INTERNAL MEDICINE

## 2022-07-18 PROCEDURE — 3066F NEPHROPATHY DOC TX: CPT | Mod: CPTII,S$GLB,, | Performed by: INTERNAL MEDICINE

## 2022-07-18 PROCEDURE — 3066F PR DOCUMENTATION OF TREATMENT FOR NEPHROPATHY: ICD-10-PCS | Mod: CPTII,S$GLB,, | Performed by: INTERNAL MEDICINE

## 2022-07-18 PROCEDURE — 1159F MED LIST DOCD IN RCRD: CPT | Mod: CPTII,S$GLB,, | Performed by: INTERNAL MEDICINE

## 2022-07-18 PROCEDURE — 1159F PR MEDICATION LIST DOCUMENTED IN MEDICAL RECORD: ICD-10-PCS | Mod: CPTII,S$GLB,, | Performed by: INTERNAL MEDICINE

## 2022-07-18 PROCEDURE — 99214 PR OFFICE/OUTPT VISIT, EST, LEVL IV, 30-39 MIN: ICD-10-PCS | Mod: S$GLB,,, | Performed by: INTERNAL MEDICINE

## 2022-07-18 PROCEDURE — 1160F RVW MEDS BY RX/DR IN RCRD: CPT | Mod: CPTII,S$GLB,, | Performed by: INTERNAL MEDICINE

## 2022-07-18 PROCEDURE — 3046F PR MOST RECENT HEMOGLOBIN A1C LEVEL > 9.0%: ICD-10-PCS | Mod: CPTII,S$GLB,, | Performed by: INTERNAL MEDICINE

## 2022-07-18 PROCEDURE — 3008F PR BODY MASS INDEX (BMI) DOCUMENTED: ICD-10-PCS | Mod: CPTII,S$GLB,, | Performed by: INTERNAL MEDICINE

## 2022-07-18 PROCEDURE — 3008F BODY MASS INDEX DOCD: CPT | Mod: CPTII,S$GLB,, | Performed by: INTERNAL MEDICINE

## 2022-07-18 PROCEDURE — 3079F PR MOST RECENT DIASTOLIC BLOOD PRESSURE 80-89 MM HG: ICD-10-PCS | Mod: CPTII,S$GLB,, | Performed by: INTERNAL MEDICINE

## 2022-07-18 PROCEDURE — 3077F SYST BP >= 140 MM HG: CPT | Mod: CPTII,S$GLB,, | Performed by: INTERNAL MEDICINE

## 2022-07-18 RX ORDER — LISINOPRIL 20 MG/1
20 TABLET ORAL DAILY
Qty: 90 TABLET | Refills: 3 | Status: SHIPPED | OUTPATIENT
Start: 2022-07-18 | End: 2023-05-22 | Stop reason: SDUPTHER

## 2022-07-18 NOTE — PROGRESS NOTES
Subjective:      Patient ID: Deepali Madrid is a 71 y.o. female.    Chief Complaint: Diabetes (3 month f/u )    Patient with h/o DM. Patient last HbA1c was 9.2 worsen from 8.2.  Patient is not able to check blood sugar regularly. Patient denies polyuria, polydipsia, numbness in hands or feet.  Restarted on Trulicity and reports tolerating it well.  Patient is also on glimepiride Januvia.        Patient is being followed by Endocrinology for Hyper-parathyroidism. Patient also has Osteoporosis and is on Actonel.      Review of Systems   Constitutional: Negative for chills, diaphoresis, fever, malaise/fatigue and weight loss (3 lbs weight gain).   HENT: Negative for congestion, ear pain, sinus pain, sore throat and tinnitus.    Eyes: Negative for blurred vision and photophobia.   Respiratory: Negative for cough, hemoptysis, shortness of breath and wheezing.    Cardiovascular: Negative for chest pain, palpitations, orthopnea, leg swelling and PND.   Gastrointestinal: Negative for abdominal pain, blood in stool, constipation, diarrhea, heartburn, melena, nausea and vomiting.   Genitourinary: Negative for dysuria, frequency and urgency.   Musculoskeletal: Negative for back pain, myalgias and neck pain.   Skin: Negative for rash.   Neurological: Negative for dizziness, tremors, seizures, loss of consciousness and weakness.   Endo/Heme/Allergies: Negative for polydipsia.   Psychiatric/Behavioral: Negative for depression and hallucinations. The patient does not have insomnia.      Objective:     Physical Exam  Constitutional:       General: She is not in acute distress.     Appearance: She is not diaphoretic.   Neck:      Thyroid: No thyromegaly.   Cardiovascular:      Rate and Rhythm: Normal rate and regular rhythm.      Heart sounds: Normal heart sounds. No murmur heard.  Pulmonary:      Effort: Pulmonary effort is normal. No respiratory distress.      Breath sounds: Normal breath sounds. No wheezing.   Abdominal:       General: Bowel sounds are normal. There is no distension.      Palpations: Abdomen is soft.      Tenderness: There is no abdominal tenderness.   Lymphadenopathy:      Cervical: No cervical adenopathy.   Neurological:      Mental Status: She is alert and oriented to person, place, and time.   Psychiatric:         Behavior: Behavior normal.         Thought Content: Thought content normal.         Judgment: Judgment normal.       Assessment:     1. Type 2 diabetes mellitus without complication, without long-term current use of insulin    2. Age-related osteoporosis without current pathological fracture    3. Hyperparathyroidism    4. Factor V Leiden mutation    5. Chronic deep vein thrombosis (DVT) of popliteal vein of left lower extremity       Plan:     Patient last A1c of 9.2 has worsen from 8.4.  Patient Bs seem better controlled.   Will repeat CMP and HbA1c.   Patient has osteoporosis and is on actonil.  Patient has h/o DVT and factor 5 laden mutation.  Anticoagulation was recommended by Hematology but patient declined anticoagulation  Patient has hyperparathyroidism and is being followed by endocrinologist  Patient blood pressures are running high will increase lisinopril to 20 mg  Advised patient to come to clinic in next week to get blood pressures checked

## 2022-07-19 DIAGNOSIS — E21.3 HYPERPARATHYROIDISM: Primary | ICD-10-CM

## 2022-07-19 LAB
ALBUMIN SERPL-MCNC: 4.4 G/DL (ref 3.6–5.1)
ALBUMIN/GLOB SERPL: 1.2 (CALC) (ref 1–2.5)
ALP SERPL-CCNC: 72 U/L (ref 37–153)
ALT SERPL-CCNC: 18 U/L (ref 6–29)
AST SERPL-CCNC: 31 U/L (ref 10–35)
BILIRUB SERPL-MCNC: 0.8 MG/DL (ref 0.2–1.2)
BUN SERPL-MCNC: 16 MG/DL (ref 7–25)
BUN/CREAT SERPL: ABNORMAL (CALC) (ref 6–22)
CALCIUM SERPL-MCNC: 12.1 MG/DL (ref 8.6–10.4)
CHLORIDE SERPL-SCNC: 103 MMOL/L (ref 98–110)
CO2 SERPL-SCNC: 26 MMOL/L (ref 20–32)
CREAT SERPL-MCNC: 0.76 MG/DL (ref 0.6–1)
EGFR: 84 ML/MIN/1.73M2
GLOBULIN SER CALC-MCNC: 3.6 G/DL (CALC) (ref 1.9–3.7)
GLUCOSE SERPL-MCNC: 82 MG/DL (ref 65–99)
HBA1C MFR BLD: 6.6 % OF TOTAL HGB
POTASSIUM SERPL-SCNC: 4.1 MMOL/L (ref 3.5–5.3)
PROT SERPL-MCNC: 8 G/DL (ref 6.1–8.1)
SODIUM SERPL-SCNC: 141 MMOL/L (ref 135–146)

## 2022-08-16 ENCOUNTER — PATIENT OUTREACH (OUTPATIENT)
Dept: ADMINISTRATIVE | Facility: HOSPITAL | Age: 72
End: 2022-08-16
Payer: MEDICARE

## 2022-08-25 LAB — PTH-INTACT SERPL-MCNC: 59 PG/ML (ref 16–77)

## 2022-08-29 ENCOUNTER — OFFICE VISIT (OUTPATIENT)
Dept: PRIMARY CARE CLINIC | Facility: CLINIC | Age: 72
End: 2022-08-29
Payer: MEDICARE

## 2022-08-29 VITALS
OXYGEN SATURATION: 95 % | BODY MASS INDEX: 24.1 KG/M2 | HEIGHT: 68 IN | DIASTOLIC BLOOD PRESSURE: 70 MMHG | SYSTOLIC BLOOD PRESSURE: 103 MMHG | HEART RATE: 71 BPM | RESPIRATION RATE: 16 BRPM | TEMPERATURE: 97 F | WEIGHT: 159 LBS

## 2022-08-29 DIAGNOSIS — E21.3 HYPERPARATHYROIDISM: Primary | ICD-10-CM

## 2022-08-29 DIAGNOSIS — E11.9 TYPE 2 DIABETES MELLITUS WITHOUT COMPLICATION, WITHOUT LONG-TERM CURRENT USE OF INSULIN: ICD-10-CM

## 2022-08-29 DIAGNOSIS — I10 PRIMARY HYPERTENSION: ICD-10-CM

## 2022-08-29 PROCEDURE — 3044F PR MOST RECENT HEMOGLOBIN A1C LEVEL <7.0%: ICD-10-PCS | Mod: CPTII,S$GLB,, | Performed by: INTERNAL MEDICINE

## 2022-08-29 PROCEDURE — 3044F HG A1C LEVEL LT 7.0%: CPT | Mod: CPTII,S$GLB,, | Performed by: INTERNAL MEDICINE

## 2022-08-29 PROCEDURE — 99213 PR OFFICE/OUTPT VISIT, EST, LEVL III, 20-29 MIN: ICD-10-PCS | Mod: S$GLB,,, | Performed by: INTERNAL MEDICINE

## 2022-08-29 PROCEDURE — 1160F RVW MEDS BY RX/DR IN RCRD: CPT | Mod: CPTII,S$GLB,, | Performed by: INTERNAL MEDICINE

## 2022-08-29 PROCEDURE — 4010F PR ACE/ARB THEARPY RXD/TAKEN: ICD-10-PCS | Mod: CPTII,S$GLB,, | Performed by: INTERNAL MEDICINE

## 2022-08-29 PROCEDURE — 1159F PR MEDICATION LIST DOCUMENTED IN MEDICAL RECORD: ICD-10-PCS | Mod: CPTII,S$GLB,, | Performed by: INTERNAL MEDICINE

## 2022-08-29 PROCEDURE — 3074F SYST BP LT 130 MM HG: CPT | Mod: CPTII,S$GLB,, | Performed by: INTERNAL MEDICINE

## 2022-08-29 PROCEDURE — 1160F PR REVIEW ALL MEDS BY PRESCRIBER/CLIN PHARMACIST DOCUMENTED: ICD-10-PCS | Mod: CPTII,S$GLB,, | Performed by: INTERNAL MEDICINE

## 2022-08-29 PROCEDURE — 4010F ACE/ARB THERAPY RXD/TAKEN: CPT | Mod: CPTII,S$GLB,, | Performed by: INTERNAL MEDICINE

## 2022-08-29 PROCEDURE — 3078F PR MOST RECENT DIASTOLIC BLOOD PRESSURE < 80 MM HG: ICD-10-PCS | Mod: CPTII,S$GLB,, | Performed by: INTERNAL MEDICINE

## 2022-08-29 PROCEDURE — 3061F PR NEG MICROALBUMINURIA RESULT DOCUMENTED/REVIEW: ICD-10-PCS | Mod: CPTII,S$GLB,, | Performed by: INTERNAL MEDICINE

## 2022-08-29 PROCEDURE — 3078F DIAST BP <80 MM HG: CPT | Mod: CPTII,S$GLB,, | Performed by: INTERNAL MEDICINE

## 2022-08-29 PROCEDURE — 3066F PR DOCUMENTATION OF TREATMENT FOR NEPHROPATHY: ICD-10-PCS | Mod: CPTII,S$GLB,, | Performed by: INTERNAL MEDICINE

## 2022-08-29 PROCEDURE — 3066F NEPHROPATHY DOC TX: CPT | Mod: CPTII,S$GLB,, | Performed by: INTERNAL MEDICINE

## 2022-08-29 PROCEDURE — 3008F BODY MASS INDEX DOCD: CPT | Mod: CPTII,S$GLB,, | Performed by: INTERNAL MEDICINE

## 2022-08-29 PROCEDURE — 1159F MED LIST DOCD IN RCRD: CPT | Mod: CPTII,S$GLB,, | Performed by: INTERNAL MEDICINE

## 2022-08-29 PROCEDURE — 3061F NEG MICROALBUMINURIA REV: CPT | Mod: CPTII,S$GLB,, | Performed by: INTERNAL MEDICINE

## 2022-08-29 PROCEDURE — 3074F PR MOST RECENT SYSTOLIC BLOOD PRESSURE < 130 MM HG: ICD-10-PCS | Mod: CPTII,S$GLB,, | Performed by: INTERNAL MEDICINE

## 2022-08-29 PROCEDURE — 3008F PR BODY MASS INDEX (BMI) DOCUMENTED: ICD-10-PCS | Mod: CPTII,S$GLB,, | Performed by: INTERNAL MEDICINE

## 2022-08-29 PROCEDURE — 99213 OFFICE O/P EST LOW 20 MIN: CPT | Mod: S$GLB,,, | Performed by: INTERNAL MEDICINE

## 2022-08-29 RX ORDER — METFORMIN HYDROCHLORIDE 1000 MG/1
1000 TABLET ORAL 2 TIMES DAILY
Qty: 180 TABLET | Refills: 3 | Status: SHIPPED | OUTPATIENT
Start: 2022-08-29 | End: 2023-06-26 | Stop reason: SDUPTHER

## 2022-08-29 NOTE — PROGRESS NOTES
Subjective:      Patient ID: Deepali Madrid is a 71 y.o. female.    Chief Complaint: Follow-up    Patient with h/o DM. Patient last HbA1c was 6.6 much improved from 9.2  Patient reports home BS are running 120-180. Patient denies polyuria, polydipsia, numbness in hands or feet.  Is currently on Trulicity, Januvia, glimepiride, metformin.  Patient denies any hypoglycemic symptoms.  Patient has appointment with ophthalmologist next week.  Patient is not being followed by podiatrist    Patient is being followed by Endocrinology for Hyper-parathyroidism.  Last PTH was normal.  Patient also has Osteoporosis and is on Actonel.      Review of Systems   Constitutional:  Negative for chills, diaphoresis, fever, malaise/fatigue and weight loss (2 lbs weight gain).   HENT:  Negative for congestion, ear pain, sinus pain, sore throat and tinnitus.    Eyes:  Negative for blurred vision and photophobia.   Respiratory:  Negative for cough, hemoptysis, shortness of breath and wheezing.    Cardiovascular:  Negative for chest pain, palpitations, orthopnea, leg swelling and PND.   Gastrointestinal:  Negative for abdominal pain, blood in stool, constipation, diarrhea, heartburn, melena, nausea and vomiting.   Genitourinary:  Negative for dysuria, frequency and urgency.   Musculoskeletal:  Negative for back pain, myalgias and neck pain.   Skin:  Negative for rash.   Neurological:  Negative for dizziness, tremors, seizures, loss of consciousness and weakness.   Endo/Heme/Allergies:  Negative for polydipsia.   Psychiatric/Behavioral:  Negative for depression and hallucinations. The patient does not have insomnia.    Objective:     Physical Exam  Constitutional:       General: She is not in acute distress.     Appearance: She is not diaphoretic.   Neck:      Thyroid: No thyromegaly.   Cardiovascular:      Rate and Rhythm: Normal rate and regular rhythm.      Heart sounds: Normal heart sounds. No murmur heard.  Pulmonary:      Effort:  Pulmonary effort is normal. No respiratory distress.      Breath sounds: Normal breath sounds. No wheezing.   Abdominal:      General: Bowel sounds are normal. There is no distension.      Palpations: Abdomen is soft.      Tenderness: There is no abdominal tenderness.   Lymphadenopathy:      Cervical: No cervical adenopathy.   Neurological:      Mental Status: She is alert and oriented to person, place, and time.   Psychiatric:         Behavior: Behavior normal.         Thought Content: Thought content normal.         Judgment: Judgment normal.     Assessment:     1. Type 2 diabetes mellitus without complication, without long-term current use of insulin         Plan:     Patient last A1c of 6.6 much improved from 9.2  Patient denies any hypoglycemic episodes  Will continue same medication for now..  If patient develops low blood sugar will decrease glimepiride to 1 mg.  Will repeat labs on return  Patient has osteoporosis and is on actonil.  Patient has h/o DVT and factor 5 laden mutation.  Anticoagulation was recommended by Hematology but patient declined anticoagulation  Patient was seeing endocrinologist for hyperparathyroidism.  Last PTH was normal  Patient blood pressures are under good control.  Will continue medication

## 2022-10-12 ENCOUNTER — PATIENT OUTREACH (OUTPATIENT)
Dept: ADMINISTRATIVE | Facility: HOSPITAL | Age: 72
End: 2022-10-12
Payer: MEDICARE

## 2022-11-07 ENCOUNTER — OFFICE VISIT (OUTPATIENT)
Dept: PRIMARY CARE CLINIC | Facility: CLINIC | Age: 72
End: 2022-11-07
Payer: MEDICARE

## 2022-11-07 VITALS
RESPIRATION RATE: 16 BRPM | SYSTOLIC BLOOD PRESSURE: 104 MMHG | HEIGHT: 68 IN | HEART RATE: 79 BPM | BODY MASS INDEX: 24.15 KG/M2 | OXYGEN SATURATION: 95 % | TEMPERATURE: 98 F | DIASTOLIC BLOOD PRESSURE: 71 MMHG | WEIGHT: 159.38 LBS

## 2022-11-07 DIAGNOSIS — Z12.11 COLON CANCER SCREENING: ICD-10-CM

## 2022-11-07 DIAGNOSIS — I10 PRIMARY HYPERTENSION: ICD-10-CM

## 2022-11-07 DIAGNOSIS — E11.9 TYPE 2 DIABETES MELLITUS WITHOUT COMPLICATION, WITHOUT LONG-TERM CURRENT USE OF INSULIN: Primary | ICD-10-CM

## 2022-11-07 DIAGNOSIS — E21.3 HYPERPARATHYROIDISM: ICD-10-CM

## 2022-11-07 PROCEDURE — 1160F PR REVIEW ALL MEDS BY PRESCRIBER/CLIN PHARMACIST DOCUMENTED: ICD-10-PCS | Mod: CPTII,S$GLB,, | Performed by: INTERNAL MEDICINE

## 2022-11-07 PROCEDURE — 3074F PR MOST RECENT SYSTOLIC BLOOD PRESSURE < 130 MM HG: ICD-10-PCS | Mod: CPTII,S$GLB,, | Performed by: INTERNAL MEDICINE

## 2022-11-07 PROCEDURE — 3061F NEG MICROALBUMINURIA REV: CPT | Mod: CPTII,S$GLB,, | Performed by: INTERNAL MEDICINE

## 2022-11-07 PROCEDURE — 4010F ACE/ARB THERAPY RXD/TAKEN: CPT | Mod: CPTII,S$GLB,, | Performed by: INTERNAL MEDICINE

## 2022-11-07 PROCEDURE — 99213 PR OFFICE/OUTPT VISIT, EST, LEVL III, 20-29 MIN: ICD-10-PCS | Mod: S$GLB,,, | Performed by: INTERNAL MEDICINE

## 2022-11-07 PROCEDURE — 3066F PR DOCUMENTATION OF TREATMENT FOR NEPHROPATHY: ICD-10-PCS | Mod: CPTII,S$GLB,, | Performed by: INTERNAL MEDICINE

## 2022-11-07 PROCEDURE — 99213 OFFICE O/P EST LOW 20 MIN: CPT | Mod: S$GLB,,, | Performed by: INTERNAL MEDICINE

## 2022-11-07 PROCEDURE — 3066F NEPHROPATHY DOC TX: CPT | Mod: CPTII,S$GLB,, | Performed by: INTERNAL MEDICINE

## 2022-11-07 PROCEDURE — 3044F HG A1C LEVEL LT 7.0%: CPT | Mod: CPTII,S$GLB,, | Performed by: INTERNAL MEDICINE

## 2022-11-07 PROCEDURE — 3078F PR MOST RECENT DIASTOLIC BLOOD PRESSURE < 80 MM HG: ICD-10-PCS | Mod: CPTII,S$GLB,, | Performed by: INTERNAL MEDICINE

## 2022-11-07 PROCEDURE — 1159F MED LIST DOCD IN RCRD: CPT | Mod: CPTII,S$GLB,, | Performed by: INTERNAL MEDICINE

## 2022-11-07 PROCEDURE — 3044F PR MOST RECENT HEMOGLOBIN A1C LEVEL <7.0%: ICD-10-PCS | Mod: CPTII,S$GLB,, | Performed by: INTERNAL MEDICINE

## 2022-11-07 PROCEDURE — 1159F PR MEDICATION LIST DOCUMENTED IN MEDICAL RECORD: ICD-10-PCS | Mod: CPTII,S$GLB,, | Performed by: INTERNAL MEDICINE

## 2022-11-07 PROCEDURE — 3008F BODY MASS INDEX DOCD: CPT | Mod: CPTII,S$GLB,, | Performed by: INTERNAL MEDICINE

## 2022-11-07 PROCEDURE — 3074F SYST BP LT 130 MM HG: CPT | Mod: CPTII,S$GLB,, | Performed by: INTERNAL MEDICINE

## 2022-11-07 PROCEDURE — 4010F PR ACE/ARB THEARPY RXD/TAKEN: ICD-10-PCS | Mod: CPTII,S$GLB,, | Performed by: INTERNAL MEDICINE

## 2022-11-07 PROCEDURE — 3008F PR BODY MASS INDEX (BMI) DOCUMENTED: ICD-10-PCS | Mod: CPTII,S$GLB,, | Performed by: INTERNAL MEDICINE

## 2022-11-07 PROCEDURE — 1160F RVW MEDS BY RX/DR IN RCRD: CPT | Mod: CPTII,S$GLB,, | Performed by: INTERNAL MEDICINE

## 2022-11-07 PROCEDURE — 3061F PR NEG MICROALBUMINURIA RESULT DOCUMENTED/REVIEW: ICD-10-PCS | Mod: CPTII,S$GLB,, | Performed by: INTERNAL MEDICINE

## 2022-11-07 PROCEDURE — 3078F DIAST BP <80 MM HG: CPT | Mod: CPTII,S$GLB,, | Performed by: INTERNAL MEDICINE

## 2022-11-07 NOTE — PROGRESS NOTES
Subjective:      Patient ID: Deepali Madrid is a 72 y.o. female.    Chief Complaint: Follow-up (Pt stated no new complaints today)    Patient with h/o DM. Patient last HbA1c was 6.6 much improved from 9.2  Patient reports home BS are running 120-180. Patient denies polyuria, polydipsia, numbness in hands or feet.  Is currently on Trulicity, Januvia, glimepiride, metformin.  Patient denies any hypoglycemic symptoms.  And is being followed by ophthalmologist.    Patient is being followed by Endocrinology for Hyper-parathyroidism.  Last PTH was normal.  Patient also has Osteoporosis and is on Actonel.      Review of Systems   Constitutional:  Negative for chills, diaphoresis, fever, malaise/fatigue and weight loss.   HENT:  Negative for congestion, ear pain, sinus pain, sore throat and tinnitus.    Eyes:  Negative for blurred vision and photophobia.   Respiratory:  Negative for cough, hemoptysis, shortness of breath and wheezing.    Cardiovascular:  Negative for chest pain, palpitations, orthopnea, leg swelling and PND.   Gastrointestinal:  Negative for abdominal pain, blood in stool, constipation, diarrhea, heartburn, melena, nausea and vomiting.   Genitourinary:  Negative for dysuria, frequency and urgency.   Musculoskeletal:  Negative for back pain, myalgias and neck pain.   Skin:  Negative for rash.   Neurological:  Negative for dizziness, tremors, seizures, loss of consciousness and weakness.   Endo/Heme/Allergies:  Negative for polydipsia.   Psychiatric/Behavioral:  Negative for depression and hallucinations. The patient does not have insomnia.    Objective:     Physical Exam  Constitutional:       General: She is not in acute distress.     Appearance: She is not diaphoretic.   Neck:      Thyroid: No thyromegaly.   Cardiovascular:      Rate and Rhythm: Normal rate and regular rhythm.      Heart sounds: Normal heart sounds. No murmur heard.  Pulmonary:      Effort: Pulmonary effort is normal. No respiratory  distress.      Breath sounds: Normal breath sounds. No wheezing.   Abdominal:      General: Bowel sounds are normal. There is no distension.      Palpations: Abdomen is soft.      Tenderness: There is no abdominal tenderness.   Lymphadenopathy:      Cervical: No cervical adenopathy.   Neurological:      Mental Status: She is alert and oriented to person, place, and time.   Psychiatric:         Behavior: Behavior normal.         Thought Content: Thought content normal.         Judgment: Judgment normal.     Assessment:     1. Type 2 diabetes mellitus without complication, without long-term current use of insulin    2. Primary hypertension    3. Colon cancer screening    4. Hyperparathyroidism           Plan:     Patient last A1c of 6.6 much improved from 9.2  Patient denies any hypoglycemic episodes  Will continue same medication for now..  Will repeat labs  Patient has osteoporosis and is on actonil.  Patient has h/o DVT and factor 5 laden mutation.  Anticoagulation was recommended by Hematology but patient declined anticoagulation  Patient was seeing endocrinologist for hyperparathyroidism.  Last PTH was normal  Patient blood pressures are under good control.  Will continue medication  Patient last colonoscopy in 2019 showed some polyps and repeat colonoscopy was recommended in 3 years  Patient declined colonoscopy  Consult patient about need to have colonoscopy

## 2022-11-08 DIAGNOSIS — E21.3 HYPERPARATHYROIDISM: Primary | ICD-10-CM

## 2022-11-08 LAB
ALBUMIN SERPL-MCNC: 4.6 G/DL (ref 3.6–5.1)
ALBUMIN/GLOB SERPL: 1.4 (CALC) (ref 1–2.5)
ALP SERPL-CCNC: 64 U/L (ref 37–153)
ALT SERPL-CCNC: 27 U/L (ref 6–29)
AST SERPL-CCNC: 46 U/L (ref 10–35)
BILIRUB SERPL-MCNC: 0.7 MG/DL (ref 0.2–1.2)
BUN SERPL-MCNC: 18 MG/DL (ref 7–25)
BUN/CREAT SERPL: ABNORMAL (CALC) (ref 6–22)
CALCIUM SERPL-MCNC: 12.5 MG/DL (ref 8.6–10.4)
CHLORIDE SERPL-SCNC: 105 MMOL/L (ref 98–110)
CO2 SERPL-SCNC: 24 MMOL/L (ref 20–32)
CREAT SERPL-MCNC: 0.79 MG/DL (ref 0.6–1)
EGFR: 79 ML/MIN/1.73M2
GLOBULIN SER CALC-MCNC: 3.4 G/DL (CALC) (ref 1.9–3.7)
GLUCOSE SERPL-MCNC: 124 MG/DL (ref 65–99)
HBA1C MFR BLD: 6.9 % OF TOTAL HGB
POTASSIUM SERPL-SCNC: 4.4 MMOL/L (ref 3.5–5.3)
PROT SERPL-MCNC: 8 G/DL (ref 6.1–8.1)
SODIUM SERPL-SCNC: 140 MMOL/L (ref 135–146)

## 2023-01-19 ENCOUNTER — PATIENT OUTREACH (OUTPATIENT)
Dept: ADMINISTRATIVE | Facility: HOSPITAL | Age: 73
End: 2023-01-19
Payer: MEDICARE

## 2023-01-19 NOTE — PROGRESS NOTES
[facility] [facility] 2022 Attestation Report.    No mammogram found, in [facility], [facility] or [facility].

## 2023-02-03 ENCOUNTER — TELEPHONE (OUTPATIENT)
Dept: PRIMARY CARE CLINIC | Facility: CLINIC | Age: 73
End: 2023-02-03
Payer: MEDICARE

## 2023-02-03 DIAGNOSIS — Z12.31 BREAST CANCER SCREENING BY MAMMOGRAM: Primary | ICD-10-CM

## 2023-02-03 NOTE — TELEPHONE ENCOUNTER
----- Message from Rebecca Welsh sent at 2/3/2023  9:57 AM CST -----  Patient is calling in regards to will need an order fax to [phone] for mammo..Please call patient back once sent at [phone].

## 2023-02-07 ENCOUNTER — TELEPHONE (OUTPATIENT)
Dept: PRIMARY CARE CLINIC | Facility: CLINIC | Age: 73
End: 2023-02-07
Payer: MEDICARE

## 2023-02-07 NOTE — TELEPHONE ENCOUNTER
Pt came into office stated she havent taken trulicity for the past month due to the pharmacy not able to get it.  Pt stated shes been checking bs and they are good but shes gaining weight.,

## 2023-02-20 ENCOUNTER — OFFICE VISIT (OUTPATIENT)
Dept: PRIMARY CARE CLINIC | Facility: CLINIC | Age: 73
End: 2023-02-20
Payer: MEDICARE

## 2023-02-20 VITALS
RESPIRATION RATE: 16 BRPM | TEMPERATURE: 97 F | SYSTOLIC BLOOD PRESSURE: 113 MMHG | OXYGEN SATURATION: 99 % | HEART RATE: 62 BPM | DIASTOLIC BLOOD PRESSURE: 65 MMHG | WEIGHT: 166 LBS | BODY MASS INDEX: 25.16 KG/M2 | HEIGHT: 68 IN

## 2023-02-20 DIAGNOSIS — E21.3 HYPERPARATHYROIDISM: ICD-10-CM

## 2023-02-20 DIAGNOSIS — E11.9 TYPE 2 DIABETES MELLITUS WITHOUT COMPLICATION, WITHOUT LONG-TERM CURRENT USE OF INSULIN: ICD-10-CM

## 2023-02-20 DIAGNOSIS — E11.42 TYPE 2 DIABETES MELLITUS WITH DIABETIC POLYNEUROPATHY, WITH LONG-TERM CURRENT USE OF INSULIN: ICD-10-CM

## 2023-02-20 DIAGNOSIS — Z79.4 TYPE 2 DIABETES MELLITUS WITH DIABETIC POLYNEUROPATHY, WITH LONG-TERM CURRENT USE OF INSULIN: ICD-10-CM

## 2023-02-20 DIAGNOSIS — D68.51 FACTOR V LEIDEN MUTATION: ICD-10-CM

## 2023-02-20 DIAGNOSIS — M81.0 OSTEOPOROSIS, UNSPECIFIED OSTEOPOROSIS TYPE, UNSPECIFIED PATHOLOGICAL FRACTURE PRESENCE: ICD-10-CM

## 2023-02-20 DIAGNOSIS — Z86.718 HISTORY OF DVT (DEEP VEIN THROMBOSIS): Primary | ICD-10-CM

## 2023-02-20 PROBLEM — I82.409 ACUTE DEEP VEIN THROMBOSIS (DVT): Status: RESOLVED | Noted: 2019-03-18 | Resolved: 2023-02-20

## 2023-02-20 PROCEDURE — 3078F DIAST BP <80 MM HG: CPT | Mod: CPTII,S$GLB,, | Performed by: INTERNAL MEDICINE

## 2023-02-20 PROCEDURE — 3008F BODY MASS INDEX DOCD: CPT | Mod: CPTII,S$GLB,, | Performed by: INTERNAL MEDICINE

## 2023-02-20 PROCEDURE — 3074F PR MOST RECENT SYSTOLIC BLOOD PRESSURE < 130 MM HG: ICD-10-PCS | Mod: CPTII,S$GLB,, | Performed by: INTERNAL MEDICINE

## 2023-02-20 PROCEDURE — 1160F PR REVIEW ALL MEDS BY PRESCRIBER/CLIN PHARMACIST DOCUMENTED: ICD-10-PCS | Mod: CPTII,S$GLB,, | Performed by: INTERNAL MEDICINE

## 2023-02-20 PROCEDURE — 1159F PR MEDICATION LIST DOCUMENTED IN MEDICAL RECORD: ICD-10-PCS | Mod: CPTII,S$GLB,, | Performed by: INTERNAL MEDICINE

## 2023-02-20 PROCEDURE — 3078F PR MOST RECENT DIASTOLIC BLOOD PRESSURE < 80 MM HG: ICD-10-PCS | Mod: CPTII,S$GLB,, | Performed by: INTERNAL MEDICINE

## 2023-02-20 PROCEDURE — 4010F ACE/ARB THERAPY RXD/TAKEN: CPT | Mod: CPTII,S$GLB,, | Performed by: INTERNAL MEDICINE

## 2023-02-20 PROCEDURE — 1160F RVW MEDS BY RX/DR IN RCRD: CPT | Mod: CPTII,S$GLB,, | Performed by: INTERNAL MEDICINE

## 2023-02-20 PROCEDURE — 99214 OFFICE O/P EST MOD 30 MIN: CPT | Mod: S$GLB,,, | Performed by: INTERNAL MEDICINE

## 2023-02-20 PROCEDURE — 4010F PR ACE/ARB THEARPY RXD/TAKEN: ICD-10-PCS | Mod: CPTII,S$GLB,, | Performed by: INTERNAL MEDICINE

## 2023-02-20 PROCEDURE — 1159F MED LIST DOCD IN RCRD: CPT | Mod: CPTII,S$GLB,, | Performed by: INTERNAL MEDICINE

## 2023-02-20 PROCEDURE — 99214 PR OFFICE/OUTPT VISIT, EST, LEVL IV, 30-39 MIN: ICD-10-PCS | Mod: S$GLB,,, | Performed by: INTERNAL MEDICINE

## 2023-02-20 PROCEDURE — 3074F SYST BP LT 130 MM HG: CPT | Mod: CPTII,S$GLB,, | Performed by: INTERNAL MEDICINE

## 2023-02-20 PROCEDURE — 3008F PR BODY MASS INDEX (BMI) DOCUMENTED: ICD-10-PCS | Mod: CPTII,S$GLB,, | Performed by: INTERNAL MEDICINE

## 2023-02-20 RX ORDER — PRAVASTATIN SODIUM 40 MG/1
40 TABLET ORAL DAILY
Qty: 90 TABLET | Refills: 0 | Status: SHIPPED | OUTPATIENT
Start: 2023-02-20

## 2023-02-20 NOTE — PROGRESS NOTES
Subjective:      Patient ID: Deepali Madrid is a 72 y.o. female.    Chief Complaint: Follow-up    Patient with diabetes last A1c of 6.9 worsen from 6.6.  Patient reports home blood sugars are under good control 120-140 random.  Patient denies polyuria, polydipsia, numbness in hands or feet.  Patient is currently taking Januvia glimepiride and metformin patient is not able to get Trulicity as it is not available in marked.  Patient denies any hypoglycemic episodes.     Patient has hyperparathyroidism and is being followed by endocrinologist.  Patient last PTH was at goal.  Repeat PTH is ordered but not yet done.  Patient also has osteoporosis and is on Actonel but the medication caused hip pain.  Patient previously was taking Fosamax and that also caused severe hip pain.  Patient pain improved after stopping these medication..     Patient has factor 5 laden deficiency and has history of DVT.  Patient took Eliquis for 6 months and the medication was stopped.  Patient was evaluated by hematologist who recommended to continue anticoagulation but patient declined it.     Patient had colonoscopy 3 years ago 2019 and repeat colonoscopy was recommended 3 years ago and patient declined repeat colonoscopy.     Review of Systems   Constitutional:  Negative for chills, diaphoresis, fever, malaise/fatigue and weight loss (7lbs weight gain).   HENT:  Negative for congestion, ear pain, sinus pain, sore throat and tinnitus.    Eyes:  Negative for blurred vision and photophobia.   Respiratory:  Negative for cough, hemoptysis, shortness of breath and wheezing.    Cardiovascular:  Negative for chest pain, palpitations, orthopnea, leg swelling and PND.   Gastrointestinal:  Negative for abdominal pain, blood in stool, constipation, diarrhea, heartburn, melena, nausea and vomiting.   Genitourinary:  Negative for dysuria, frequency and urgency.   Musculoskeletal:  Negative for back pain, myalgias and neck pain.   Skin:  Negative for rash.    Neurological:  Negative for dizziness, tremors, seizures, loss of consciousness and weakness.   Endo/Heme/Allergies:  Negative for polydipsia.   Psychiatric/Behavioral:  Negative for depression and hallucinations. The patient does not have insomnia.    Objective:     Physical Exam  Constitutional:       General: She is not in acute distress.     Appearance: She is not diaphoretic.   Neck:      Thyroid: No thyromegaly.   Cardiovascular:      Rate and Rhythm: Normal rate and regular rhythm.      Heart sounds: Normal heart sounds. No murmur heard.  Pulmonary:      Effort: Pulmonary effort is normal. No respiratory distress.      Breath sounds: Normal breath sounds. No wheezing.   Abdominal:      General: Bowel sounds are normal. There is no distension.      Palpations: Abdomen is soft.      Tenderness: There is no abdominal tenderness.   Lymphadenopathy:      Cervical: No cervical adenopathy.   Neurological:      Mental Status: She is alert and oriented to person, place, and time.   Psychiatric:         Behavior: Behavior normal.         Thought Content: Thought content normal.         Judgment: Judgment normal.     Assessment:       ICD-10-CM ICD-9-CM   1. History of DVT (deep vein thrombosis)  Z86.718 V12.51   2. Hyperparathyroidism  E21.3 252.00   3. Factor V Leiden mutation  D68.51 289.81   4. Type 2 diabetes mellitus without complication, without long-term current use of insulin  E11.9 250.00       Plan:     Patient has history of hyperparathyroidism and last PTH was at goal  Will repeat PTH  Patient last A1c of 6.9 is at goal.  Will repeat A1c and CMP  Patient reports home blood sugars are better controlled  Patient is not able to get Trulicity and has gained  A few lb patient has factor 5 laden mutation and is recommended to have anticoagulation lifelong  Patient is refusing anticoagulation.  Patient is advised about the possible risk  Patient has osteoporosis and did not tolerate Fosamax and Actonel  Will  refer to [facility] for possible Prolia injections    Medication List with Changes/Refills   Current Medications    ASPIRIN (ECOTRIN) 81 MG EC TABLET    aspirin 81 mg tablet,delayed release   Take 1 tablet every other day by oral route.    DULAGLUTIDE (TRULICITY) 3 MG/0.5 ML PEN INJECTOR    Inject 3 mg into the skin every 7 days.    GLIMEPIRIDE (AMARYL) 2 MG TABLET    TAKE 2 TABLETS BY MOUTH EVERY MORNING AND 1 TABLET EVERY EVENING    JANUVIA 100 MG TAB    TAKE 1 TABLET BY MOUTH ONCE DAILY.    LISINOPRIL (PRINIVIL,ZESTRIL) 20 MG TABLET    Take 1 tablet (20 mg total) by mouth once daily.    METFORMIN (GLUCOPHAGE) 1000 MG TABLET    Take 1 tablet (1,000 mg total) by mouth 2 (two) times daily.    PRAVASTATIN (PRAVACHOL) 40 MG TABLET    TAKE 1 TABLET BY MOUTH ONCE DAILY    RISEDRONATE (ACTONEL) 35 MG TABLET    Take 1 tablet (35 mg total) by mouth every 7 days.

## 2023-02-21 LAB
ALBUMIN SERPL-MCNC: 4.5 G/DL (ref 3.6–5.1)
ALBUMIN/GLOB SERPL: 1.7 (CALC) (ref 1–2.5)
ALP SERPL-CCNC: 67 U/L (ref 37–153)
ALT SERPL-CCNC: 31 U/L (ref 6–29)
AST SERPL-CCNC: 52 U/L (ref 10–35)
BILIRUB SERPL-MCNC: 0.7 MG/DL (ref 0.2–1.2)
BUN SERPL-MCNC: 12 MG/DL (ref 7–25)
BUN/CREAT SERPL: ABNORMAL (CALC) (ref 6–22)
CALCIUM SERPL-MCNC: 11.5 MG/DL (ref 8.6–10.4)
CHLORIDE SERPL-SCNC: 106 MMOL/L (ref 98–110)
CO2 SERPL-SCNC: 23 MMOL/L (ref 20–32)
CREAT SERPL-MCNC: 0.75 MG/DL (ref 0.6–1)
EGFR: 85 ML/MIN/1.73M2
GLOBULIN SER CALC-MCNC: 2.7 G/DL (CALC) (ref 1.9–3.7)
GLUCOSE SERPL-MCNC: 129 MG/DL (ref 65–99)
HBA1C MFR BLD: 7.7 % OF TOTAL HGB
POTASSIUM SERPL-SCNC: 4.3 MMOL/L (ref 3.5–5.3)
PROT SERPL-MCNC: 7.2 G/DL (ref 6.1–8.1)
PTH-INTACT SERPL-MCNC: 82 PG/ML (ref 16–77)
SODIUM SERPL-SCNC: 139 MMOL/L (ref 135–146)

## 2023-02-23 LAB — BCS RECOMMENDATION EXT: NORMAL

## 2023-03-07 ENCOUNTER — PATIENT OUTREACH (OUTPATIENT)
Dept: ADMINISTRATIVE | Facility: HOSPITAL | Age: 73
End: 2023-03-07
Payer: MEDICARE

## 2023-03-07 NOTE — PROGRESS NOTES
[facility] Report: Per chart review, mammogram overdue, order placed 02/03/23, no record found,  attempted to contact pt no ans, lvm.

## 2023-04-10 LAB
LEFT EYE DM RETINOPATHY: NEGATIVE
RIGHT EYE DM RETINOPATHY: NEGATIVE

## 2023-05-03 ENCOUNTER — PATIENT OUTREACH (OUTPATIENT)
Dept: ADMINISTRATIVE | Facility: HOSPITAL | Age: 73
End: 2023-05-03
Payer: MEDICARE

## 2023-05-11 DIAGNOSIS — E11.9 TYPE 2 DIABETES MELLITUS WITHOUT COMPLICATION, WITHOUT LONG-TERM CURRENT USE OF INSULIN: ICD-10-CM

## 2023-05-11 NOTE — TELEPHONE ENCOUNTER
----- Message from Anisha Vanegas LPN sent at 5/8/2023  4:19 PM CDT -----  Contact: Deepali    ----- Message -----  From: Lina Quijano  Sent: 5/8/2023   1:48 PM CDT  To: [facility]ov[facility] Staff    Deepali needs a call back regards to status of an refill request for JANUVIA 100 mg Tab, Trulicity and she is not sure of the third medicine that was requested from Pharmacy, please call her at [phone].      [facility] - [address] [address]  Phone: [phone] Fax: [phone]    Thanks  Td

## 2023-05-22 ENCOUNTER — OFFICE VISIT (OUTPATIENT)
Dept: PRIMARY CARE CLINIC | Facility: CLINIC | Age: 73
End: 2023-05-22
Payer: MEDICARE

## 2023-05-22 VITALS
BODY MASS INDEX: 24.28 KG/M2 | SYSTOLIC BLOOD PRESSURE: 123 MMHG | HEIGHT: 68 IN | WEIGHT: 160.19 LBS | DIASTOLIC BLOOD PRESSURE: 82 MMHG | RESPIRATION RATE: 16 BRPM | TEMPERATURE: 97 F | HEART RATE: 73 BPM | OXYGEN SATURATION: 93 %

## 2023-05-22 DIAGNOSIS — I10 PRIMARY HYPERTENSION: ICD-10-CM

## 2023-05-22 DIAGNOSIS — Z78.0 POST-MENOPAUSE: Primary | ICD-10-CM

## 2023-05-22 DIAGNOSIS — M81.0 OSTEOPOROSIS, UNSPECIFIED OSTEOPOROSIS TYPE, UNSPECIFIED PATHOLOGICAL FRACTURE PRESENCE: ICD-10-CM

## 2023-05-22 DIAGNOSIS — E11.9 TYPE 2 DIABETES MELLITUS WITHOUT COMPLICATION, WITHOUT LONG-TERM CURRENT USE OF INSULIN: ICD-10-CM

## 2023-05-22 PROCEDURE — 3051F PR MOST RECENT HEMOGLOBIN A1C LEVEL 7.0 - < 8.0%: ICD-10-PCS | Mod: CPTII,S$GLB,, | Performed by: INTERNAL MEDICINE

## 2023-05-22 PROCEDURE — 3074F PR MOST RECENT SYSTOLIC BLOOD PRESSURE < 130 MM HG: ICD-10-PCS | Mod: CPTII,S$GLB,, | Performed by: INTERNAL MEDICINE

## 2023-05-22 PROCEDURE — 3079F PR MOST RECENT DIASTOLIC BLOOD PRESSURE 80-89 MM HG: ICD-10-PCS | Mod: CPTII,S$GLB,, | Performed by: INTERNAL MEDICINE

## 2023-05-22 PROCEDURE — 3008F PR BODY MASS INDEX (BMI) DOCUMENTED: ICD-10-PCS | Mod: CPTII,S$GLB,, | Performed by: INTERNAL MEDICINE

## 2023-05-22 PROCEDURE — 3074F SYST BP LT 130 MM HG: CPT | Mod: CPTII,S$GLB,, | Performed by: INTERNAL MEDICINE

## 2023-05-22 PROCEDURE — 3079F DIAST BP 80-89 MM HG: CPT | Mod: CPTII,S$GLB,, | Performed by: INTERNAL MEDICINE

## 2023-05-22 PROCEDURE — 1160F PR REVIEW ALL MEDS BY PRESCRIBER/CLIN PHARMACIST DOCUMENTED: ICD-10-PCS | Mod: CPTII,S$GLB,, | Performed by: INTERNAL MEDICINE

## 2023-05-22 PROCEDURE — 4010F ACE/ARB THERAPY RXD/TAKEN: CPT | Mod: CPTII,S$GLB,, | Performed by: INTERNAL MEDICINE

## 2023-05-22 PROCEDURE — 4010F PR ACE/ARB THEARPY RXD/TAKEN: ICD-10-PCS | Mod: CPTII,S$GLB,, | Performed by: INTERNAL MEDICINE

## 2023-05-22 PROCEDURE — 1159F PR MEDICATION LIST DOCUMENTED IN MEDICAL RECORD: ICD-10-PCS | Mod: CPTII,S$GLB,, | Performed by: INTERNAL MEDICINE

## 2023-05-22 PROCEDURE — 99214 OFFICE O/P EST MOD 30 MIN: CPT | Mod: S$GLB,,, | Performed by: INTERNAL MEDICINE

## 2023-05-22 PROCEDURE — 3051F HG A1C>EQUAL 7.0%<8.0%: CPT | Mod: CPTII,S$GLB,, | Performed by: INTERNAL MEDICINE

## 2023-05-22 PROCEDURE — 1159F MED LIST DOCD IN RCRD: CPT | Mod: CPTII,S$GLB,, | Performed by: INTERNAL MEDICINE

## 2023-05-22 PROCEDURE — 3008F BODY MASS INDEX DOCD: CPT | Mod: CPTII,S$GLB,, | Performed by: INTERNAL MEDICINE

## 2023-05-22 PROCEDURE — 1160F RVW MEDS BY RX/DR IN RCRD: CPT | Mod: CPTII,S$GLB,, | Performed by: INTERNAL MEDICINE

## 2023-05-22 PROCEDURE — 99214 PR OFFICE/OUTPT VISIT, EST, LEVL IV, 30-39 MIN: ICD-10-PCS | Mod: S$GLB,,, | Performed by: INTERNAL MEDICINE

## 2023-05-22 RX ORDER — DULAGLUTIDE 3 MG/.5ML
3 INJECTION, SOLUTION SUBCUTANEOUS
COMMUNITY
End: 2023-05-22 | Stop reason: SDUPTHER

## 2023-05-22 RX ORDER — DULAGLUTIDE 3 MG/.5ML
3 INJECTION, SOLUTION SUBCUTANEOUS
Qty: 12 PEN | Refills: 3 | Status: SHIPPED | OUTPATIENT
Start: 2023-05-22 | End: 2024-05-21

## 2023-05-22 RX ORDER — LISINOPRIL 20 MG/1
20 TABLET ORAL DAILY
Qty: 90 TABLET | Refills: 3 | Status: SHIPPED | OUTPATIENT
Start: 2023-05-22 | End: 2024-05-21

## 2023-05-22 RX ORDER — GLIMEPIRIDE 2 MG/1
TABLET ORAL
Qty: 90 TABLET | Refills: 3 | Status: SHIPPED | OUTPATIENT
Start: 2023-05-22 | End: 2023-08-28

## 2023-06-07 ENCOUNTER — TELEPHONE (OUTPATIENT)
Dept: PRIMARY CARE CLINIC | Facility: CLINIC | Age: 73
End: 2023-06-07
Payer: MEDICARE

## 2023-06-07 NOTE — TELEPHONE ENCOUNTER
PT INFORMED HER INS HAS APPROVED HER REQUEST FOR THE TRULICITY--PT WILL CALL HER PHARMACY AND LET THEM KNOW SO SHE WILL BE ABLE TO KNOW WHEN TO

## 2023-06-08 ENCOUNTER — TELEPHONE (OUTPATIENT)
Dept: PRIMARY CARE CLINIC | Facility: CLINIC | Age: 73
End: 2023-06-08
Payer: MEDICARE

## 2023-06-26 ENCOUNTER — OFFICE VISIT (OUTPATIENT)
Dept: PRIMARY CARE CLINIC | Facility: CLINIC | Age: 73
End: 2023-06-26
Payer: MEDICARE

## 2023-06-26 VITALS
HEART RATE: 70 BPM | HEIGHT: 68 IN | BODY MASS INDEX: 24.52 KG/M2 | WEIGHT: 161.81 LBS | SYSTOLIC BLOOD PRESSURE: 114 MMHG | DIASTOLIC BLOOD PRESSURE: 77 MMHG | RESPIRATION RATE: 16 BRPM | OXYGEN SATURATION: 95 % | TEMPERATURE: 97 F

## 2023-06-26 DIAGNOSIS — D68.51 FACTOR V LEIDEN MUTATION: ICD-10-CM

## 2023-06-26 DIAGNOSIS — I10 PRIMARY HYPERTENSION: Primary | ICD-10-CM

## 2023-06-26 DIAGNOSIS — M81.0 OSTEOPOROSIS, UNSPECIFIED OSTEOPOROSIS TYPE, UNSPECIFIED PATHOLOGICAL FRACTURE PRESENCE: ICD-10-CM

## 2023-06-26 DIAGNOSIS — E11.9 TYPE 2 DIABETES MELLITUS WITHOUT COMPLICATION, WITHOUT LONG-TERM CURRENT USE OF INSULIN: ICD-10-CM

## 2023-06-26 LAB
ABS NRBC COUNT: 0 THOU/UL (ref 0–0.01)
ABSOLUTE BASOPHIL: 0 10*3/UL (ref 0–0.3)
ABSOLUTE EOSINOPHIL: 0.1 10*3/UL (ref 0–0.6)
ABSOLUTE IMMATURE GRAN: 0.02 THOU/UL (ref 0–0.03)
ABSOLUTE LYMPHOCYTE: 1.9 10*3/UL (ref 1.2–4)
ABSOLUTE MONOCYTE: 0.4 10*3/UL (ref 0.1–0.8)
ALBUMIN SERPL BCP-MCNC: 3.7 G/DL (ref 3.4–5)
ALP SERPL-CCNC: 72 U/L (ref 45–117)
ALT SERPL W P-5'-P-CCNC: 46 U/L (ref 13–56)
ANION GAP SERPL CALC-SCNC: 6 MMOL/L (ref 3–11)
AST SERPL-CCNC: 78 U/L (ref 15–37)
BASOPHILS NFR BLD: 0.6 % (ref 0–3)
BILIRUB SERPL-MCNC: 0.9 MG/DL (ref 0.2–1)
BUN SERPL-MCNC: 10 MG/DL (ref 7–18)
BUN/CREAT SERPL: 13.33 RATIO
CALCIUM SERPL-MCNC: 10.9 MG/DL (ref 8.5–10.1)
CHLORIDE SERPL-SCNC: 107 MMOL/L (ref 98–107)
CHOLEST SERPL-MSCNC: 141 MG/DL
CO2 SERPL-SCNC: 23 MMOL/L (ref 21–32)
CREAT SERPL-MCNC: 0.75 MG/DL (ref 0.55–1.02)
CREATININE, URINE: 197.8 MG/DL (ref 10–175)
EOSINOPHIL NFR BLD: 1.3 % (ref 0–6)
ERYTHROCYTE [DISTWIDTH] IN BLOOD BY AUTOMATED COUNT: 13.9 % (ref 0–15.5)
ESTIMATED AVG GLUCOSE: 236 MG/DL
GFR ESTIMATION: > 60
GLUCOSE SERPL-MCNC: 160 MG/DL (ref 74–106)
HBA1C MFR BLD: 8.8 % (ref 4.2–6.3)
HCT VFR BLD AUTO: 39.2 % (ref 37–47)
HDLC SERPL-MCNC: 31 MG/DL
HGB BLD-MCNC: 12.8 G/DL (ref 12–16)
IMMATURE GRANULOCYTES: 0.4 % (ref 0–0.43)
LDLC SERPL CALC-MCNC: 70.8 MG/DL
LYMPHOCYTES NFR BLD: 35.7 % (ref 20–45)
MCH RBC QN AUTO: 29.5 PG (ref 27–32)
MCHC RBC AUTO-ENTMCNC: 32.7 % (ref 32–36)
MCV RBC AUTO: 90.3 FL (ref 80–99)
MICROALBUMIN URINE: 80.8 MG/L (ref 0–20)
MICROALBUMIN/CREATININE RATIO: 40 MG/G (ref 0–30)
MONOCYTES NFR BLD: 6.6 % (ref 2–10)
NEUTROPHILS # BLD AUTO: 2.9 10*3/UL (ref 1.4–7)
NEUTROPHILS NFR BLD: 55.4 % (ref 50–80)
NUCLEATED RED BLOOD CELLS: 0 % (ref 0–0.2)
PLATELETS: 202 10*3/UL (ref 130–400)
PMV BLD AUTO: 9.2 FL (ref 9.2–12.2)
POTASSIUM SERPL-SCNC: 4.4 MMOL/L (ref 3.5–5.1)
PROT SERPL-MCNC: 7.6 G/DL (ref 6.4–8.2)
RBC # BLD AUTO: 4.34 10*6/UL (ref 4.2–5.4)
SODIUM BLD-SCNC: 136 MMOL/L (ref 131–143)
TRIGL SERPL-MCNC: 196 MG/DL (ref 0–149)
TSH SERPL DL<=0.005 MIU/L-ACNC: 1.66 UIU/ML (ref 0.36–3.74)
VLDL CHOLESTEROL: 39 MG/DL
WBC # BLD: 5.3 10*3/UL (ref 4.5–10)

## 2023-06-26 PROCEDURE — 99214 PR OFFICE/OUTPT VISIT, EST, LEVL IV, 30-39 MIN: ICD-10-PCS | Mod: S$GLB,,, | Performed by: INTERNAL MEDICINE

## 2023-06-26 PROCEDURE — 3074F SYST BP LT 130 MM HG: CPT | Mod: CPTII,S$GLB,, | Performed by: INTERNAL MEDICINE

## 2023-06-26 PROCEDURE — 4010F ACE/ARB THERAPY RXD/TAKEN: CPT | Mod: CPTII,S$GLB,, | Performed by: INTERNAL MEDICINE

## 2023-06-26 PROCEDURE — 3078F DIAST BP <80 MM HG: CPT | Mod: CPTII,S$GLB,, | Performed by: INTERNAL MEDICINE

## 2023-06-26 PROCEDURE — 4010F PR ACE/ARB THEARPY RXD/TAKEN: ICD-10-PCS | Mod: CPTII,S$GLB,, | Performed by: INTERNAL MEDICINE

## 2023-06-26 PROCEDURE — 99214 OFFICE O/P EST MOD 30 MIN: CPT | Mod: S$GLB,,, | Performed by: INTERNAL MEDICINE

## 2023-06-26 PROCEDURE — 3008F PR BODY MASS INDEX (BMI) DOCUMENTED: ICD-10-PCS | Mod: CPTII,S$GLB,, | Performed by: INTERNAL MEDICINE

## 2023-06-26 PROCEDURE — 1159F MED LIST DOCD IN RCRD: CPT | Mod: CPTII,S$GLB,, | Performed by: INTERNAL MEDICINE

## 2023-06-26 PROCEDURE — 3008F BODY MASS INDEX DOCD: CPT | Mod: CPTII,S$GLB,, | Performed by: INTERNAL MEDICINE

## 2023-06-26 PROCEDURE — 1159F PR MEDICATION LIST DOCUMENTED IN MEDICAL RECORD: ICD-10-PCS | Mod: CPTII,S$GLB,, | Performed by: INTERNAL MEDICINE

## 2023-06-26 PROCEDURE — 3074F PR MOST RECENT SYSTOLIC BLOOD PRESSURE < 130 MM HG: ICD-10-PCS | Mod: CPTII,S$GLB,, | Performed by: INTERNAL MEDICINE

## 2023-06-26 PROCEDURE — 3051F HG A1C>EQUAL 7.0%<8.0%: CPT | Mod: CPTII,S$GLB,, | Performed by: INTERNAL MEDICINE

## 2023-06-26 PROCEDURE — 1160F PR REVIEW ALL MEDS BY PRESCRIBER/CLIN PHARMACIST DOCUMENTED: ICD-10-PCS | Mod: CPTII,S$GLB,, | Performed by: INTERNAL MEDICINE

## 2023-06-26 PROCEDURE — 3051F PR MOST RECENT HEMOGLOBIN A1C LEVEL 7.0 - < 8.0%: ICD-10-PCS | Mod: CPTII,S$GLB,, | Performed by: INTERNAL MEDICINE

## 2023-06-26 PROCEDURE — 1160F RVW MEDS BY RX/DR IN RCRD: CPT | Mod: CPTII,S$GLB,, | Performed by: INTERNAL MEDICINE

## 2023-06-26 PROCEDURE — 3078F PR MOST RECENT DIASTOLIC BLOOD PRESSURE < 80 MM HG: ICD-10-PCS | Mod: CPTII,S$GLB,, | Performed by: INTERNAL MEDICINE

## 2023-06-26 RX ORDER — METFORMIN HYDROCHLORIDE 1000 MG/1
1000 TABLET ORAL 2 TIMES DAILY
Qty: 180 TABLET | Refills: 3 | Status: SHIPPED | OUTPATIENT
Start: 2023-06-26 | End: 2024-06-25

## 2023-06-26 NOTE — PROGRESS NOTES
Subjective:      Patient ID: Deepali Madrid is a 72 y.o. female.    Chief Complaint: Follow-up     Diabetes with last A1c of 7.7 worsen from 6.9.  Patient reports home blood sugars are running 140-180 and nonfasting < 200.  Patient is taking metformin, glimepiride, Trulicity and reports tolerating the medication well.  Patient denies polyuria polydipsia, numbness in hands or feet.  Patient denies any hypoglycemic episodes.    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  Patient is recommended to continue anticoagulation after 6 months of Eliquis treatment but patient declined.  Patient also had colonoscopy in 2019 and repeat colonoscopy is recommended in 3 years but patient is declining to repeat colonoscopy.    Patient has osteoporosis and has tried Actonel and Fosamax but both medication card severe joint pains.  Patient is switched to Prolia but is declining to use the injection.    Review of Systems   Constitutional:  Negative for chills, diaphoresis, fever, malaise/fatigue and weight loss.   HENT:  Negative for congestion, ear pain, sinus pain, sore throat and tinnitus.    Eyes:  Negative for blurred vision and photophobia.   Respiratory:  Negative for cough, hemoptysis, shortness of breath and wheezing.    Cardiovascular:  Negative for chest pain, palpitations, orthopnea, leg swelling and PND.   Gastrointestinal:  Negative for abdominal pain, blood in stool, constipation, diarrhea, heartburn, melena, nausea and vomiting.   Genitourinary:  Negative for dysuria, frequency and urgency.   Musculoskeletal:  Negative for back pain, myalgias and neck pain.   Skin:  Negative for rash.   Neurological:  Negative for dizziness, tremors, seizures, loss of consciousness and weakness.   Endo/Heme/Allergies:  Negative for polydipsia.   Psychiatric/Behavioral:  Negative for depression and hallucinations. The patient does not have insomnia.    Objective:   /77 (BP Location: Left arm, Patient Position:  "Sitting, BP Method: Medium (Automatic))   Pulse 70   Temp 97.4 °F (36.3 °C) (Temporal)   Resp 16   Ht 5' 8" (1.727 m)   Wt 73.4 kg (161 lb 12.8 oz)   SpO2 95%   BMI 24.60 kg/m²     Physical Exam  Constitutional:       General: She is not in acute distress.     Appearance: She is not diaphoretic.   Neck:      Thyroid: No thyromegaly.   Cardiovascular:      Rate and Rhythm: Normal rate and regular rhythm.      Heart sounds: Normal heart sounds. No murmur heard.  Pulmonary:      Effort: Pulmonary effort is normal. No respiratory distress.      Breath sounds: Normal breath sounds. No wheezing.   Abdominal:      General: Bowel sounds are normal. There is no distension.      Palpations: Abdomen is soft.      Tenderness: There is no abdominal tenderness.   Lymphadenopathy:      Cervical: No cervical adenopathy.   Neurological:      Mental Status: She is alert and oriented to person, place, and time.   Psychiatric:         Behavior: Behavior normal.         Thought Content: Thought content normal.         Judgment: Judgment normal.     Assessment:       ICD-10-CM ICD-9-CM   1. Primary hypertension  I10 401.9   2. Type 2 diabetes mellitus without complication, without long-term current use of insulin  E11.9 250.00   3. Osteoporosis, unspecified osteoporosis type, unspecified pathological fracture presence  M81.0 733.00   4. Factor V Leiden mutation  D68.51 289.81       Plan:     Patient blood pressure seem under good control.  Will continue medication.  Patient has diabetes with last A1c of 7.7.  Blood sugars to be running high  Will repeat labs  Patient has osteoporosis and did not tolerate Actonel and Fosamax  Prolia is ordered but patient is declining the medication  Patient has factor 5 laden mutation.  Anticoagulation is recommended but declined by patient  Colonoscopy is recommended but declined by patient  Consult patient in detail about colonoscopy, anticoagulation and treatment for osteoporosis    Medication " List with Changes/Refills   Current Medications    ASPIRIN (ECOTRIN) 81 MG EC TABLET    aspirin 81 mg tablet,delayed release   Take 1 tablet every other day by oral route.    DENOSUMAB (PROLIA) 60 MG/ML SYRG    Inject 1 mL (60 mg total) into the skin every 6 (six) months.    DULAGLUTIDE (TRULICITY) 3 MG/0.5 ML PEN INJECTOR    Inject 3 mg into the skin every 7 days.    GLIMEPIRIDE (AMARYL) 2 MG TABLET    TAKE 2 TABLETS BY MOUTH EVERY MORNING AND 1 TABLET EVERY EVENING    LISINOPRIL (PRINIVIL,ZESTRIL) 20 MG TABLET    Take 1 tablet (20 mg total) by mouth once daily.    METFORMIN (GLUCOPHAGE) 1000 MG TABLET    Take 1 tablet (1,000 mg total) by mouth 2 (two) times daily.    PRAVASTATIN (PRAVACHOL) 40 MG TABLET    Take 1 tablet (40 mg total) by mouth once daily.   Discontinued Medications    SITAGLIPTIN PHOSPHATE (JANUVIA) 100 MG TAB    Take 1 tablet (100 mg total) by mouth once daily.

## 2023-08-28 ENCOUNTER — OFFICE VISIT (OUTPATIENT)
Dept: PRIMARY CARE CLINIC | Facility: CLINIC | Age: 73
End: 2023-08-28
Payer: MEDICARE

## 2023-08-28 VITALS
RESPIRATION RATE: 16 BRPM | SYSTOLIC BLOOD PRESSURE: 96 MMHG | BODY MASS INDEX: 23.34 KG/M2 | WEIGHT: 154 LBS | HEIGHT: 68 IN | OXYGEN SATURATION: 96 % | DIASTOLIC BLOOD PRESSURE: 69 MMHG | HEART RATE: 73 BPM | TEMPERATURE: 97 F

## 2023-08-28 DIAGNOSIS — M81.0 OSTEOPOROSIS, UNSPECIFIED OSTEOPOROSIS TYPE, UNSPECIFIED PATHOLOGICAL FRACTURE PRESENCE: ICD-10-CM

## 2023-08-28 DIAGNOSIS — I10 PRIMARY HYPERTENSION: ICD-10-CM

## 2023-08-28 DIAGNOSIS — E11.9 TYPE 2 DIABETES MELLITUS WITHOUT COMPLICATION, WITHOUT LONG-TERM CURRENT USE OF INSULIN: Primary | ICD-10-CM

## 2023-08-28 DIAGNOSIS — D68.51 FACTOR V LEIDEN MUTATION: ICD-10-CM

## 2023-08-28 PROCEDURE — 3078F DIAST BP <80 MM HG: CPT | Mod: CPTII,S$GLB,, | Performed by: INTERNAL MEDICINE

## 2023-08-28 PROCEDURE — 3066F PR DOCUMENTATION OF TREATMENT FOR NEPHROPATHY: ICD-10-PCS | Mod: CPTII,S$GLB,, | Performed by: INTERNAL MEDICINE

## 2023-08-28 PROCEDURE — 3008F BODY MASS INDEX DOCD: CPT | Mod: CPTII,S$GLB,, | Performed by: INTERNAL MEDICINE

## 2023-08-28 PROCEDURE — 1159F PR MEDICATION LIST DOCUMENTED IN MEDICAL RECORD: ICD-10-PCS | Mod: CPTII,S$GLB,, | Performed by: INTERNAL MEDICINE

## 2023-08-28 PROCEDURE — 3060F POS MICROALBUMINURIA REV: CPT | Mod: CPTII,S$GLB,, | Performed by: INTERNAL MEDICINE

## 2023-08-28 PROCEDURE — 1160F RVW MEDS BY RX/DR IN RCRD: CPT | Mod: CPTII,S$GLB,, | Performed by: INTERNAL MEDICINE

## 2023-08-28 PROCEDURE — 3052F HG A1C>EQUAL 8.0%<EQUAL 9.0%: CPT | Mod: CPTII,S$GLB,, | Performed by: INTERNAL MEDICINE

## 2023-08-28 PROCEDURE — 1159F MED LIST DOCD IN RCRD: CPT | Mod: CPTII,S$GLB,, | Performed by: INTERNAL MEDICINE

## 2023-08-28 PROCEDURE — G0439 PPPS, SUBSEQ VISIT: HCPCS | Mod: S$GLB,,, | Performed by: INTERNAL MEDICINE

## 2023-08-28 PROCEDURE — 3060F PR POS MICROALBUMINURIA RESULT DOCUMENTED/REVIEW: ICD-10-PCS | Mod: CPTII,S$GLB,, | Performed by: INTERNAL MEDICINE

## 2023-08-28 PROCEDURE — 4010F PR ACE/ARB THEARPY RXD/TAKEN: ICD-10-PCS | Mod: CPTII,S$GLB,, | Performed by: INTERNAL MEDICINE

## 2023-08-28 PROCEDURE — 4010F ACE/ARB THERAPY RXD/TAKEN: CPT | Mod: CPTII,S$GLB,, | Performed by: INTERNAL MEDICINE

## 2023-08-28 PROCEDURE — 3052F PR MOST RECENT HEMOGLOBIN A1C LEVEL 8.0 - < 9.0%: ICD-10-PCS | Mod: CPTII,S$GLB,, | Performed by: INTERNAL MEDICINE

## 2023-08-28 PROCEDURE — 2023F DILAT RTA XM W/O RTNOPTHY: CPT | Mod: CPTII,S$GLB,, | Performed by: INTERNAL MEDICINE

## 2023-08-28 PROCEDURE — 3066F NEPHROPATHY DOC TX: CPT | Mod: CPTII,S$GLB,, | Performed by: INTERNAL MEDICINE

## 2023-08-28 PROCEDURE — 99214 PR OFFICE/OUTPT VISIT, EST, LEVL IV, 30-39 MIN: ICD-10-PCS | Mod: 25,S$GLB,, | Performed by: INTERNAL MEDICINE

## 2023-08-28 PROCEDURE — 2023F PR DILATED RETINAL EXAM W/O EVID OF RETINOPATHY: ICD-10-PCS | Mod: CPTII,S$GLB,, | Performed by: INTERNAL MEDICINE

## 2023-08-28 PROCEDURE — 3074F SYST BP LT 130 MM HG: CPT | Mod: CPTII,S$GLB,, | Performed by: INTERNAL MEDICINE

## 2023-08-28 PROCEDURE — 3008F PR BODY MASS INDEX (BMI) DOCUMENTED: ICD-10-PCS | Mod: CPTII,S$GLB,, | Performed by: INTERNAL MEDICINE

## 2023-08-28 PROCEDURE — 99214 OFFICE O/P EST MOD 30 MIN: CPT | Mod: 25,S$GLB,, | Performed by: INTERNAL MEDICINE

## 2023-08-28 PROCEDURE — G0439 PR MEDICARE ANNUAL WELLNESS SUBSEQUENT VISIT: ICD-10-PCS | Mod: S$GLB,,, | Performed by: INTERNAL MEDICINE

## 2023-08-28 PROCEDURE — 1160F PR REVIEW ALL MEDS BY PRESCRIBER/CLIN PHARMACIST DOCUMENTED: ICD-10-PCS | Mod: CPTII,S$GLB,, | Performed by: INTERNAL MEDICINE

## 2023-08-28 PROCEDURE — 3078F PR MOST RECENT DIASTOLIC BLOOD PRESSURE < 80 MM HG: ICD-10-PCS | Mod: CPTII,S$GLB,, | Performed by: INTERNAL MEDICINE

## 2023-08-28 PROCEDURE — 3074F PR MOST RECENT SYSTOLIC BLOOD PRESSURE < 130 MM HG: ICD-10-PCS | Mod: CPTII,S$GLB,, | Performed by: INTERNAL MEDICINE

## 2023-08-28 RX ORDER — GLIMEPIRIDE 2 MG/1
2 TABLET ORAL
COMMUNITY

## 2023-08-28 NOTE — PROGRESS NOTES
Subjective:      Patient ID: Deepali Madrid is a 72 y.o. female.    Chief Complaint: Medicare AWV (2month f/u )    HPI:   Diabetes with last A1c of 8.8 worsen from 7.7.  Patient reports home blood sugars are running 140-150 and nonfasting < 200.  Patient is taking metformin, glimepiride, Trulicity and reports tolerating the medication well.  Patient denies polyuria polydipsia, numbness in hands or feet.  Patient denies any hypoglycemic episodes.     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  Patient is recommended to continue anticoagulation after 6 months of Eliquis treatment but patient declined.  Patient also had colonoscopy in 2019 and repeat colonoscopy is recommended in 3 years but patient is declining to repeat colonoscopy.     Patient has osteoporosis and has tried Actonel and Fosamax but both medication card severe joint pains.  Patient is switched to Prolia but is declining to use the injection.    Review of Systems   Constitutional:  Negative for chills, diaphoresis, fever, malaise/fatigue and weight loss.   HENT:  Negative for congestion, ear pain, sinus pain, sore throat and tinnitus.    Eyes:  Negative for blurred vision and photophobia.   Respiratory:  Negative for cough, hemoptysis, shortness of breath and wheezing.    Cardiovascular:  Negative for chest pain, palpitations, orthopnea, leg swelling and PND.   Gastrointestinal:  Negative for abdominal pain, blood in stool, constipation, diarrhea, heartburn, melena, nausea and vomiting.   Genitourinary:  Negative for dysuria, frequency and urgency.   Musculoskeletal:  Negative for back pain, myalgias and neck pain.   Skin:  Negative for rash.   Neurological:  Negative for dizziness, tremors, seizures, loss of consciousness and weakness.   Endo/Heme/Allergies:  Negative for polydipsia.   Psychiatric/Behavioral:  Negative for depression and hallucinations. The patient does not have insomnia.      Objective:   BP 96/69 (BP Location: Left  "arm, Patient Position: Sitting, BP Method: Medium (Automatic))   Pulse 73   Temp 97.2 °F (36.2 °C) (Temporal)   Resp 16   Ht 5' 8" (1.727 m)   Wt 69.9 kg (154 lb)   SpO2 96%   BMI 23.42 kg/m²     Physical Exam  Constitutional:       General: She is not in acute distress.     Appearance: She is not diaphoretic.   Neck:      Thyroid: No thyromegaly.   Cardiovascular:      Rate and Rhythm: Normal rate and regular rhythm.      Heart sounds: Normal heart sounds. No murmur heard.  Pulmonary:      Effort: Pulmonary effort is normal. No respiratory distress.      Breath sounds: Normal breath sounds. No wheezing.   Abdominal:      General: Bowel sounds are normal. There is no distension.      Palpations: Abdomen is soft.      Tenderness: There is no abdominal tenderness.   Lymphadenopathy:      Cervical: No cervical adenopathy.   Neurological:      Mental Status: She is alert and oriented to person, place, and time.   Psychiatric:         Behavior: Behavior normal.         Thought Content: Thought content normal.         Judgment: Judgment normal.       Assessment:       ICD-10-CM ICD-9-CM   1. Type 2 diabetes mellitus without complication, without long-term current use of insulin  E11.9 250.00   2. Primary hypertension  I10 401.9   3. Osteoporosis, unspecified osteoporosis type, unspecified pathological fracture presence  M81.0 733.00   4. Factor V Leiden mutation  D68.51 289.81       Plan:     Patient with diabetes with last A1c of 8.8 worsen from 7.7  Blood sugars are better controlled  Patient is taking glimepiride 1 tablet in the morning only  Advised patient to monitor blood sugar after meals as well  Patient has osteoporosis and is prescribed bisphosphonate but did not tolerate the medication  Patient was prescribed Prolia but is adamant not to take the medicine/injection  Factor 5 laden deficiency, patient is recommended to continue anticoagulation.  Patient has declined    Medication List with Changes/Refills "   Current Medications    ASPIRIN (ECOTRIN) 81 MG EC TABLET    aspirin 81 mg tablet,delayed release   Take 1 tablet every other day by oral route.    DULAGLUTIDE (TRULICITY) 3 MG/0.5 ML PEN INJECTOR    Inject 3 mg into the skin every 7 days.    GLIMEPIRIDE (AMARYL) 2 MG TABLET    Take 2 mg by mouth before breakfast.    LISINOPRIL (PRINIVIL,ZESTRIL) 20 MG TABLET    Take 1 tablet (20 mg total) by mouth once daily.    METFORMIN (GLUCOPHAGE) 1000 MG TABLET    Take 1 tablet (1,000 mg total) by mouth 2 (two) times daily.    PRAVASTATIN (PRAVACHOL) 40 MG TABLET    Take 1 tablet (40 mg total) by mouth once daily.   Discontinued Medications    DENOSUMAB (PROLIA) 60 MG/ML SYRG    Inject 1 mL (60 mg total) into the skin every 6 (six) months.    GLIMEPIRIDE (AMARYL) 2 MG TABLET    TAKE 2 TABLETS BY MOUTH EVERY MORNING AND 1 TABLET EVERY EVENING        Deepali Madrid presented for a  Medicare AWV and comprehensive Health Risk Assessment today. The following components were reviewed and updated:      Health Risk Assessment  During the past four weeks, was someone available to help if you needed and wanted help?: No, not at all  Can you get to places out of walking distance without help?  (For example, can you travel alone on buses or taxis, or drive your own car?): Yes  Can you go shopping for groceries or clothes without someone's help?: Yes  Can you prepare your own meals?: Yes  Can you do your own housework without help?: Yes  Are you a smoker?: No     Health Maintenance Topics with due status: Not Due       Topic Last Completion Date    TETANUS VACCINE 12/26/2016    Mammogram 02/23/2023    Eye Exam 04/10/2023    Diabetes Urine Screening 06/26/2023    Lipid Panel 06/26/2023    Low Dose Statin 08/28/2023    Influenza Vaccine Not Due      Patient declined COVID, pneumonia, Tdap, flu vaccine  Patient last colonoscopy was in 2019 and repeat colonoscopy was recommended in 3 years  Patient firmly declined colonoscopy  Bone density is  ordered but not yet done    Patient Care Team:  Luca Anderson MD as PCP - General (Internal Medicine)  [facility]  Oumou YOUNG MD as Consulting Physician (Ophthalmology)          ** See Completed Assessments for Annual Wellness Visit within the encounter summary.**             The following assessments were completed:    Depression Screening  Depression Patient Health Questionnaire (PHQ-2)  Over the last two weeks how often have you been bothered by little interest or pleasure in doing things: Not at all  Over the last two weeks how often have you been bothered by feeling down, depressed or hopeless: Not at all  PHQ-2 Total Score: 0     Whisper Test  Whisper Test: Normal   Cognitive Function Screening  Mini-Cog Instant Recall  How many words have been recalled?: 3  Clock Drawing Test  The total for the clock drawing test is either 0 or 2: Yes  Clock Drawing Score: 2  Mini-Cog 3 minute recall  How many words have been recalled?: 2  Cognitive Score: 4     Nutrition Screening:  Patient BMI of 23 is at goal                                   Patient is quite active and is very adherent to diet  ADL Screening:  Patient is very active and takes care of her animals      Patient declined use of control medication including opiates and benzodiazepine      Provided Deepali with a 5-10 year written screening schedule and personal prevention plan. Recommendations were developed using the USPSTF age appropriate recommendations. Education, counseling, and referrals were provided as needed. After Visit Summary printed and given to patient which includes a list of additional screenings\tests needed.    I offered to discuss end of life issues, including information on how to make advance directives that the patient could use to name someone who would make medical decisions on their behalf if they became too ill to make themselves.    ___Patient declined - already done.  ___Virtual Visit, not  discussed  _x__Patient is interested, I provided paperwork and offered to discuss.  Patient does not have any living family and reports she has a neighbor who takes care of her and helps her.  In case patient is not able to make the decision her neighbor will be responsible for medical decision-making        No follow-ups on file.    Luca Anderson MD

## 2023-10-16 LAB
LEFT EYE DM RETINOPATHY: POSITIVE
RIGHT EYE DM RETINOPATHY: POSITIVE

## 2023-10-23 ENCOUNTER — OFFICE VISIT (OUTPATIENT)
Dept: PRIMARY CARE CLINIC | Facility: CLINIC | Age: 73
End: 2023-10-23
Payer: MEDICARE

## 2023-10-23 ENCOUNTER — CLINICAL SUPPORT (OUTPATIENT)
Dept: OBSTETRICS AND GYNECOLOGY | Facility: CLINIC | Age: 73
End: 2023-10-23
Payer: MEDICARE

## 2023-10-23 VITALS
TEMPERATURE: 97 F | HEART RATE: 61 BPM | SYSTOLIC BLOOD PRESSURE: 111 MMHG | OXYGEN SATURATION: 96 % | RESPIRATION RATE: 16 BRPM | WEIGHT: 154.81 LBS | HEIGHT: 68 IN | DIASTOLIC BLOOD PRESSURE: 74 MMHG | BODY MASS INDEX: 23.46 KG/M2

## 2023-10-23 DIAGNOSIS — M81.0 OSTEOPOROSIS, UNSPECIFIED OSTEOPOROSIS TYPE, UNSPECIFIED PATHOLOGICAL FRACTURE PRESENCE: ICD-10-CM

## 2023-10-23 DIAGNOSIS — I10 PRIMARY HYPERTENSION: ICD-10-CM

## 2023-10-23 DIAGNOSIS — Z01.89 ROUTINE LAB DRAW: Primary | ICD-10-CM

## 2023-10-23 DIAGNOSIS — E11.9 TYPE 2 DIABETES MELLITUS WITHOUT COMPLICATION, WITHOUT LONG-TERM CURRENT USE OF INSULIN: Primary | ICD-10-CM

## 2023-10-23 PROCEDURE — 3078F PR MOST RECENT DIASTOLIC BLOOD PRESSURE < 80 MM HG: ICD-10-PCS | Mod: CPTII,S$GLB,, | Performed by: INTERNAL MEDICINE

## 2023-10-23 PROCEDURE — 36415 PR COLLECTION VENOUS BLOOD,VENIPUNCTURE: ICD-10-PCS | Mod: S$GLB,,, | Performed by: INTERNAL MEDICINE

## 2023-10-23 PROCEDURE — 1160F RVW MEDS BY RX/DR IN RCRD: CPT | Mod: CPTII,S$GLB,, | Performed by: INTERNAL MEDICINE

## 2023-10-23 PROCEDURE — 1159F MED LIST DOCD IN RCRD: CPT | Mod: CPTII,S$GLB,, | Performed by: INTERNAL MEDICINE

## 2023-10-23 PROCEDURE — 3060F POS MICROALBUMINURIA REV: CPT | Mod: CPTII,S$GLB,, | Performed by: INTERNAL MEDICINE

## 2023-10-23 PROCEDURE — 36415 COLL VENOUS BLD VENIPUNCTURE: CPT | Mod: S$GLB,,, | Performed by: INTERNAL MEDICINE

## 2023-10-23 PROCEDURE — 99214 PR OFFICE/OUTPT VISIT, EST, LEVL IV, 30-39 MIN: ICD-10-PCS | Mod: S$GLB,,, | Performed by: INTERNAL MEDICINE

## 2023-10-23 PROCEDURE — 3060F PR POS MICROALBUMINURIA RESULT DOCUMENTED/REVIEW: ICD-10-PCS | Mod: CPTII,S$GLB,, | Performed by: INTERNAL MEDICINE

## 2023-10-23 PROCEDURE — 3008F BODY MASS INDEX DOCD: CPT | Mod: CPTII,S$GLB,, | Performed by: INTERNAL MEDICINE

## 2023-10-23 PROCEDURE — 3078F DIAST BP <80 MM HG: CPT | Mod: CPTII,S$GLB,, | Performed by: INTERNAL MEDICINE

## 2023-10-23 PROCEDURE — 3008F PR BODY MASS INDEX (BMI) DOCUMENTED: ICD-10-PCS | Mod: CPTII,S$GLB,, | Performed by: INTERNAL MEDICINE

## 2023-10-23 PROCEDURE — 3066F PR DOCUMENTATION OF TREATMENT FOR NEPHROPATHY: ICD-10-PCS | Mod: CPTII,S$GLB,, | Performed by: INTERNAL MEDICINE

## 2023-10-23 PROCEDURE — 1160F PR REVIEW ALL MEDS BY PRESCRIBER/CLIN PHARMACIST DOCUMENTED: ICD-10-PCS | Mod: CPTII,S$GLB,, | Performed by: INTERNAL MEDICINE

## 2023-10-23 PROCEDURE — 2023F DILAT RTA XM W/O RTNOPTHY: CPT | Mod: CPTII,S$GLB,, | Performed by: INTERNAL MEDICINE

## 2023-10-23 PROCEDURE — 3052F HG A1C>EQUAL 8.0%<EQUAL 9.0%: CPT | Mod: CPTII,S$GLB,, | Performed by: INTERNAL MEDICINE

## 2023-10-23 PROCEDURE — 3066F NEPHROPATHY DOC TX: CPT | Mod: CPTII,S$GLB,, | Performed by: INTERNAL MEDICINE

## 2023-10-23 PROCEDURE — 3074F SYST BP LT 130 MM HG: CPT | Mod: CPTII,S$GLB,, | Performed by: INTERNAL MEDICINE

## 2023-10-23 PROCEDURE — 3052F PR MOST RECENT HEMOGLOBIN A1C LEVEL 8.0 - < 9.0%: ICD-10-PCS | Mod: CPTII,S$GLB,, | Performed by: INTERNAL MEDICINE

## 2023-10-23 PROCEDURE — 3074F PR MOST RECENT SYSTOLIC BLOOD PRESSURE < 130 MM HG: ICD-10-PCS | Mod: CPTII,S$GLB,, | Performed by: INTERNAL MEDICINE

## 2023-10-23 PROCEDURE — 4010F PR ACE/ARB THEARPY RXD/TAKEN: ICD-10-PCS | Mod: CPTII,S$GLB,, | Performed by: INTERNAL MEDICINE

## 2023-10-23 PROCEDURE — 4010F ACE/ARB THERAPY RXD/TAKEN: CPT | Mod: CPTII,S$GLB,, | Performed by: INTERNAL MEDICINE

## 2023-10-23 PROCEDURE — 99214 OFFICE O/P EST MOD 30 MIN: CPT | Mod: S$GLB,,, | Performed by: INTERNAL MEDICINE

## 2023-10-23 PROCEDURE — 2023F PR DILATED RETINAL EXAM W/O EVID OF RETINOPATHY: ICD-10-PCS | Mod: CPTII,S$GLB,, | Performed by: INTERNAL MEDICINE

## 2023-10-23 PROCEDURE — 1159F PR MEDICATION LIST DOCUMENTED IN MEDICAL RECORD: ICD-10-PCS | Mod: CPTII,S$GLB,, | Performed by: INTERNAL MEDICINE

## 2023-10-23 RX ORDER — LYSINE HCL 500 MG
1 TABLET ORAL DAILY
Qty: 100 TABLET | Refills: 3 | COMMUNITY
Start: 2023-10-23

## 2023-10-23 NOTE — PROGRESS NOTES
"  Subjective:      Patient ID: Deepali Madrid is a 73 y.o. female.    Chief Complaint: Diabetes (2month f/u )    :  Diabetes with last A1c of 8.8 worsen from 7.7.  Patient reports fasting blood sugars are running 150-160 and nonfasting blood sugar less than 200.  Patient is taking metformin glimepiride Trulicity.  Patient denies polyuria polydipsia numbness in hands or feet.  Patient denies any hypoglycemic episode    Patient has osteoporosis and has tried Actonel and Fosamax but did not tolerate the medication as these caused severe joint pains.  Patient was switched to Prolia but declined the use of injection.     Review of Systems   Constitutional:  Negative for chills, diaphoresis, fever, malaise/fatigue and weight loss.   HENT:  Negative for congestion, ear pain, sinus pain, sore throat and tinnitus.    Eyes:  Negative for blurred vision and photophobia.   Respiratory:  Negative for cough, hemoptysis, shortness of breath and wheezing.    Cardiovascular:  Negative for chest pain, palpitations, orthopnea, leg swelling and PND.   Gastrointestinal:  Negative for abdominal pain, blood in stool, constipation, diarrhea, heartburn, melena, nausea and vomiting.   Genitourinary:  Negative for dysuria, frequency and urgency.   Musculoskeletal:  Negative for back pain, myalgias and neck pain.   Skin:  Negative for rash.   Neurological:  Negative for dizziness, tremors, seizures, loss of consciousness and weakness.   Endo/Heme/Allergies:  Negative for polydipsia.   Psychiatric/Behavioral:  Negative for depression and hallucinations. The patient does not have insomnia.      Objective:   /74 (BP Location: Left arm, Patient Position: Sitting, BP Method: Medium (Automatic))   Pulse 61   Temp 97.2 °F (36.2 °C) (Temporal)   Resp 16   Ht 5' 8" (1.727 m)   Wt 70.2 kg (154 lb 12.8 oz)   SpO2 96%   BMI 23.54 kg/m²     Physical Exam:  Patient not examined  Assessment:       ICD-10-CM ICD-9-CM   1. Type 2 diabetes mellitus " without complication, without long-term current use of insulin  E11.9 250.00   2. Primary hypertension  I10 401.9   3. Osteoporosis, unspecified osteoporosis type, unspecified pathological fracture presence  M81.0 733.00       Plan:     Patient with last A1c of 8.8 suggest blood sugars are not under control  Patient blood sugars seem to be running high  Will repeat A1c today and if higher than 7.5 will increase glimepiride to 4 mg  Patient has hypertension and blood pressure seem under okay control  Patient last LDL of 70 is close to goal  Will continue same dose of medication  Patient declined shingle, flu, pneumonia vaccine  Patient declined colonoscopy or Cologuard  Patient has osteoporosis but declined Prolia injection.  Will continue calcium and vitamin-D  Medication List with Changes/Refills   New Medications    CALCIUM CARBONATE-VIT D3- MG CALCIUM- 400 UNIT TAB    Take 1 tablet by mouth once daily.   Current Medications    ASPIRIN (ECOTRIN) 81 MG EC TABLET    aspirin 81 mg tablet,delayed release   Take 1 tablet every other day by oral route.    DULAGLUTIDE (TRULICITY) 3 MG/0.5 ML PEN INJECTOR    Inject 3 mg into the skin every 7 days.    GLIMEPIRIDE (AMARYL) 2 MG TABLET    Take 2 mg by mouth before breakfast.    LISINOPRIL (PRINIVIL,ZESTRIL) 20 MG TABLET    Take 1 tablet (20 mg total) by mouth once daily.    METFORMIN (GLUCOPHAGE) 1000 MG TABLET    Take 1 tablet (1,000 mg total) by mouth 2 (two) times daily.    PRAVASTATIN (PRAVACHOL) 40 MG TABLET    Take 1 tablet (40 mg total) by mouth once daily.

## 2023-10-24 LAB
ALBUMIN SERPL BCP-MCNC: 3.9 G/DL (ref 3.4–5)
ALP SERPL-CCNC: 74 U/L (ref 45–117)
ALT SERPL W P-5'-P-CCNC: 49 U/L (ref 13–56)
ANION GAP SERPL CALC-SCNC: 9 MMOL/L (ref 3–11)
AST SERPL-CCNC: 86 U/L (ref 15–37)
BILIRUB SERPL-MCNC: 0.9 MG/DL (ref 0.2–1)
BUN SERPL-MCNC: 14 MG/DL (ref 7–18)
BUN/CREAT SERPL: 18.42 RATIO
CALCIUM SERPL-MCNC: 10.7 MG/DL (ref 8.5–10.1)
CHLORIDE SERPL-SCNC: 107 MMOL/L (ref 98–107)
CO2 SERPL-SCNC: 22 MMOL/L (ref 21–32)
CREAT SERPL-MCNC: 0.76 MG/DL (ref 0.55–1.02)
ESTIMATED AVG GLUCOSE: 172 MG/DL
GFR ESTIMATION: > 60
GLUCOSE SERPL-MCNC: 99 MG/DL (ref 74–106)
HBA1C MFR BLD: 7 % (ref 4.2–6.3)
POTASSIUM SERPL-SCNC: 4.8 MMOL/L (ref 3.5–5.1)
PROT SERPL-MCNC: 7.6 G/DL (ref 6.4–8.2)
SODIUM BLD-SCNC: 138 MMOL/L (ref 131–143)

## 2023-11-03 ENCOUNTER — PATIENT OUTREACH (OUTPATIENT)
Dept: ADMINISTRATIVE | Facility: HOSPITAL | Age: 73
End: 2023-11-03
Payer: MEDICARE

## 2023-12-05 ENCOUNTER — OFFICE VISIT (OUTPATIENT)
Dept: PRIMARY CARE CLINIC | Facility: CLINIC | Age: 73
End: 2023-12-05
Payer: MEDICARE

## 2023-12-05 VITALS
BODY MASS INDEX: 23.34 KG/M2 | HEART RATE: 75 BPM | RESPIRATION RATE: 16 BRPM | HEIGHT: 68 IN | SYSTOLIC BLOOD PRESSURE: 116 MMHG | DIASTOLIC BLOOD PRESSURE: 70 MMHG | OXYGEN SATURATION: 97 % | WEIGHT: 154 LBS

## 2023-12-05 DIAGNOSIS — I10 PRIMARY HYPERTENSION: ICD-10-CM

## 2023-12-05 DIAGNOSIS — E11.9 TYPE 2 DIABETES MELLITUS WITHOUT COMPLICATION, WITHOUT LONG-TERM CURRENT USE OF INSULIN: Primary | ICD-10-CM

## 2023-12-05 DIAGNOSIS — M81.0 OSTEOPOROSIS, UNSPECIFIED OSTEOPOROSIS TYPE, UNSPECIFIED PATHOLOGICAL FRACTURE PRESENCE: ICD-10-CM

## 2023-12-05 LAB — GLUCOSE SERPL-MCNC: 190 MG/DL (ref 70–110)

## 2023-12-05 PROCEDURE — 1160F PR REVIEW ALL MEDS BY PRESCRIBER/CLIN PHARMACIST DOCUMENTED: ICD-10-PCS | Mod: CPTII,S$GLB,, | Performed by: INTERNAL MEDICINE

## 2023-12-05 PROCEDURE — 3078F DIAST BP <80 MM HG: CPT | Mod: CPTII,S$GLB,, | Performed by: INTERNAL MEDICINE

## 2023-12-05 PROCEDURE — 99214 PR OFFICE/OUTPT VISIT, EST, LEVL IV, 30-39 MIN: ICD-10-PCS | Mod: S$GLB,,, | Performed by: INTERNAL MEDICINE

## 2023-12-05 PROCEDURE — 82962 POCT GLUCOSE, HAND-HELD DEVICE: ICD-10-PCS | Mod: ,,, | Performed by: INTERNAL MEDICINE

## 2023-12-05 PROCEDURE — 3078F PR MOST RECENT DIASTOLIC BLOOD PRESSURE < 80 MM HG: ICD-10-PCS | Mod: CPTII,S$GLB,, | Performed by: INTERNAL MEDICINE

## 2023-12-05 PROCEDURE — 3060F POS MICROALBUMINURIA REV: CPT | Mod: CPTII,S$GLB,, | Performed by: INTERNAL MEDICINE

## 2023-12-05 PROCEDURE — 3008F PR BODY MASS INDEX (BMI) DOCUMENTED: ICD-10-PCS | Mod: CPTII,S$GLB,, | Performed by: INTERNAL MEDICINE

## 2023-12-05 PROCEDURE — 1101F PR PT FALLS ASSESS DOC 0-1 FALLS W/OUT INJ PAST YR: ICD-10-PCS | Mod: CPTII,S$GLB,, | Performed by: INTERNAL MEDICINE

## 2023-12-05 PROCEDURE — 1159F PR MEDICATION LIST DOCUMENTED IN MEDICAL RECORD: ICD-10-PCS | Mod: CPTII,S$GLB,, | Performed by: INTERNAL MEDICINE

## 2023-12-05 PROCEDURE — 3051F PR MOST RECENT HEMOGLOBIN A1C LEVEL 7.0 - < 8.0%: ICD-10-PCS | Mod: CPTII,S$GLB,, | Performed by: INTERNAL MEDICINE

## 2023-12-05 PROCEDURE — 1101F PT FALLS ASSESS-DOCD LE1/YR: CPT | Mod: CPTII,S$GLB,, | Performed by: INTERNAL MEDICINE

## 2023-12-05 PROCEDURE — 1160F RVW MEDS BY RX/DR IN RCRD: CPT | Mod: CPTII,S$GLB,, | Performed by: INTERNAL MEDICINE

## 2023-12-05 PROCEDURE — 82962 GLUCOSE BLOOD TEST: CPT | Mod: ,,, | Performed by: INTERNAL MEDICINE

## 2023-12-05 PROCEDURE — 3288F PR FALLS RISK ASSESSMENT DOCUMENTED: ICD-10-PCS | Mod: CPTII,S$GLB,, | Performed by: INTERNAL MEDICINE

## 2023-12-05 PROCEDURE — 99214 OFFICE O/P EST MOD 30 MIN: CPT | Mod: S$GLB,,, | Performed by: INTERNAL MEDICINE

## 2023-12-05 PROCEDURE — 3060F PR POS MICROALBUMINURIA RESULT DOCUMENTED/REVIEW: ICD-10-PCS | Mod: CPTII,S$GLB,, | Performed by: INTERNAL MEDICINE

## 2023-12-05 PROCEDURE — 3074F PR MOST RECENT SYSTOLIC BLOOD PRESSURE < 130 MM HG: ICD-10-PCS | Mod: CPTII,S$GLB,, | Performed by: INTERNAL MEDICINE

## 2023-12-05 PROCEDURE — 3288F FALL RISK ASSESSMENT DOCD: CPT | Mod: CPTII,S$GLB,, | Performed by: INTERNAL MEDICINE

## 2023-12-05 PROCEDURE — 3051F HG A1C>EQUAL 7.0%<8.0%: CPT | Mod: CPTII,S$GLB,, | Performed by: INTERNAL MEDICINE

## 2023-12-05 PROCEDURE — 4010F ACE/ARB THERAPY RXD/TAKEN: CPT | Mod: CPTII,S$GLB,, | Performed by: INTERNAL MEDICINE

## 2023-12-05 PROCEDURE — 1159F MED LIST DOCD IN RCRD: CPT | Mod: CPTII,S$GLB,, | Performed by: INTERNAL MEDICINE

## 2023-12-05 PROCEDURE — 3074F SYST BP LT 130 MM HG: CPT | Mod: CPTII,S$GLB,, | Performed by: INTERNAL MEDICINE

## 2023-12-05 PROCEDURE — 4010F PR ACE/ARB THEARPY RXD/TAKEN: ICD-10-PCS | Mod: CPTII,S$GLB,, | Performed by: INTERNAL MEDICINE

## 2023-12-05 PROCEDURE — 3008F BODY MASS INDEX DOCD: CPT | Mod: CPTII,S$GLB,, | Performed by: INTERNAL MEDICINE

## 2023-12-05 PROCEDURE — 3066F NEPHROPATHY DOC TX: CPT | Mod: CPTII,S$GLB,, | Performed by: INTERNAL MEDICINE

## 2023-12-05 PROCEDURE — 3066F PR DOCUMENTATION OF TREATMENT FOR NEPHROPATHY: ICD-10-PCS | Mod: CPTII,S$GLB,, | Performed by: INTERNAL MEDICINE

## 2023-12-05 NOTE — PROGRESS NOTES
"  Subjective:      Patient ID: Deepali Madrid is a 73 y.o. female.    Chief Complaint: Follow-up    :  Patient with diabetes with last A1c of 7 improved from 8.8.  Patient reports blood sugars are under good control.  Patient is taking metformin glimepiride and Trulicity.  Patient denies polyuria polydipsia and numbness in hands or feet.  Patient denies any hypoglycemic episode.  Under good control with lisinopril 20 mg.  Last LDL of 70 is at goal.    Has osteoporosis and has tried Actonel and Fosamax but did not tolerate the medication as these caused severe joint pains.  Patient was switched to Prolia but declined to use injection.    Review of Systems   Constitutional:  Negative for chills, diaphoresis, fever, malaise/fatigue and weight loss.   HENT:  Negative for congestion, ear pain, sinus pain, sore throat and tinnitus.    Eyes:  Negative for blurred vision and photophobia.   Respiratory:  Negative for cough, hemoptysis, shortness of breath and wheezing.    Cardiovascular:  Negative for chest pain, palpitations, orthopnea, leg swelling and PND.   Gastrointestinal:  Negative for abdominal pain, blood in stool, constipation, diarrhea, heartburn, melena, nausea and vomiting.   Genitourinary:  Negative for dysuria, frequency and urgency.   Musculoskeletal:  Negative for back pain, myalgias and neck pain.   Skin:  Negative for rash.   Neurological:  Negative for dizziness, tremors, seizures, loss of consciousness and weakness.   Endo/Heme/Allergies:  Negative for polydipsia.   Psychiatric/Behavioral:  Negative for depression and hallucinations. The patient does not have insomnia.      Objective:   /70 (BP Location: Right arm, Patient Position: Sitting, BP Method: Medium (Automatic))   Pulse 75   Resp 16   Ht 5' 8" (1.727 m)   Wt 69.9 kg (154 lb)   SpO2 97%   BMI 23.42 kg/m²     Physical Exam: Patient not examined  Assessment:       ICD-10-CM ICD-9-CM   1. Type 2 diabetes mellitus without complication, " without long-term current use of insulin  E11.9 250.00   2. Primary hypertension  I10 401.9   3. Osteoporosis, unspecified osteoporosis type, unspecified pathological fracture presence  M81.0 733.00       Plan:     Patient with diabetes with last A1c of 7 is at goal.  Will continue same medication  Will repeat labs on return  Patient has hypertension and blood pressure seem under good control.  Will continue medication  Patient has osteoporosis and advised patient to take Prolia but declined by patient.  Patient declined all vaccination.  Patient declined colonoscopy    Medication List with Changes/Refills   Current Medications    ASPIRIN (ECOTRIN) 81 MG EC TABLET    aspirin 81 mg tablet,delayed release   Take 1 tablet every other day by oral route.    CALCIUM CARBONATE-VIT D3- MG CALCIUM- 400 UNIT TAB    Take 1 tablet by mouth once daily.    DULAGLUTIDE (TRULICITY) 3 MG/0.5 ML PEN INJECTOR    Inject 3 mg into the skin every 7 days.    GLIMEPIRIDE (AMARYL) 2 MG TABLET    Take 2 mg by mouth before breakfast.    LISINOPRIL (PRINIVIL,ZESTRIL) 20 MG TABLET    Take 1 tablet (20 mg total) by mouth once daily.    METFORMIN (GLUCOPHAGE) 1000 MG TABLET    Take 1 tablet (1,000 mg total) by mouth 2 (two) times daily.    PRAVASTATIN (PRAVACHOL) 40 MG TABLET    Take 1 tablet (40 mg total) by mouth once daily.

## 2024-02-14 ENCOUNTER — CLINICAL SUPPORT (OUTPATIENT)
Dept: OBSTETRICS AND GYNECOLOGY | Facility: CLINIC | Age: 74
End: 2024-02-14
Payer: MEDICARE

## 2024-02-14 DIAGNOSIS — Z01.89 ROUTINE LAB DRAW: Primary | ICD-10-CM

## 2024-02-14 LAB
ALBUMIN SERPL BCP-MCNC: 3.9 G/DL (ref 3.4–5)
ALP SERPL-CCNC: 86 U/L (ref 45–117)
ALT SERPL W P-5'-P-CCNC: 30 U/L (ref 13–56)
ANION GAP SERPL CALC-SCNC: 6 MMOL/L (ref 3–11)
AST SERPL-CCNC: 40 U/L (ref 15–37)
BILIRUB SERPL-MCNC: 0.6 MG/DL (ref 0.2–1)
BUN SERPL-MCNC: 14 MG/DL (ref 7–18)
BUN/CREAT SERPL: 17.07 RATIO
CALCIUM SERPL-MCNC: 11.9 MG/DL (ref 8.5–10.1)
CHLORIDE SERPL-SCNC: 105 MMOL/L (ref 98–107)
CO2 SERPL-SCNC: 27 MMOL/L (ref 21–32)
CREAT SERPL-MCNC: 0.82 MG/DL (ref 0.55–1.02)
ESTIMATED AVG GLUCOSE: 183 MG/DL
GFR ESTIMATION: > 60
GLUCOSE SERPL-MCNC: 238 MG/DL (ref 74–106)
HBA1C MFR BLD: 7.3 % (ref 4.2–6.3)
POTASSIUM SERPL-SCNC: 4.5 MMOL/L (ref 3.5–5.1)
PROT SERPL-MCNC: 7.8 G/DL (ref 6.4–8.2)
SODIUM BLD-SCNC: 138 MMOL/L (ref 131–143)

## 2024-02-14 PROCEDURE — 36415 COLL VENOUS BLD VENIPUNCTURE: CPT | Mod: S$GLB,,, | Performed by: INTERNAL MEDICINE

## 2024-02-16 DIAGNOSIS — E21.3 HYPERPARATHYROIDISM: Primary | ICD-10-CM

## 2024-02-20 ENCOUNTER — OFFICE VISIT (OUTPATIENT)
Dept: PRIMARY CARE CLINIC | Facility: CLINIC | Age: 74
End: 2024-02-20
Payer: MEDICARE

## 2024-02-20 VITALS
DIASTOLIC BLOOD PRESSURE: 76 MMHG | WEIGHT: 154.38 LBS | HEIGHT: 68 IN | BODY MASS INDEX: 23.4 KG/M2 | OXYGEN SATURATION: 96 % | HEART RATE: 75 BPM | RESPIRATION RATE: 18 BRPM | SYSTOLIC BLOOD PRESSURE: 115 MMHG

## 2024-02-20 DIAGNOSIS — E11.9 TYPE 2 DIABETES MELLITUS WITHOUT COMPLICATION, WITHOUT LONG-TERM CURRENT USE OF INSULIN: Primary | ICD-10-CM

## 2024-02-20 DIAGNOSIS — E11.9 TYPE 2 DIABETES MELLITUS WITHOUT COMPLICATION, WITHOUT LONG-TERM CURRENT USE OF INSULIN: ICD-10-CM

## 2024-02-20 DIAGNOSIS — Z86.718 HISTORY OF DVT OF LOWER EXTREMITY: ICD-10-CM

## 2024-02-20 DIAGNOSIS — E21.3 HYPERPARATHYROIDISM: ICD-10-CM

## 2024-02-20 DIAGNOSIS — D68.51 FACTOR V LEIDEN MUTATION: ICD-10-CM

## 2024-02-20 LAB — GLUCOSE SERPL-MCNC: 228 MG/DL (ref 70–110)

## 2024-02-20 PROCEDURE — 3078F DIAST BP <80 MM HG: CPT | Mod: CPTII,S$GLB,, | Performed by: INTERNAL MEDICINE

## 2024-02-20 PROCEDURE — 3074F SYST BP LT 130 MM HG: CPT | Mod: CPTII,S$GLB,, | Performed by: INTERNAL MEDICINE

## 2024-02-20 PROCEDURE — 99214 OFFICE O/P EST MOD 30 MIN: CPT | Mod: S$GLB,,, | Performed by: INTERNAL MEDICINE

## 2024-02-20 PROCEDURE — 1160F RVW MEDS BY RX/DR IN RCRD: CPT | Mod: CPTII,S$GLB,, | Performed by: INTERNAL MEDICINE

## 2024-02-20 PROCEDURE — 1159F MED LIST DOCD IN RCRD: CPT | Mod: CPTII,S$GLB,, | Performed by: INTERNAL MEDICINE

## 2024-02-20 PROCEDURE — 1100F PTFALLS ASSESS-DOCD GE2>/YR: CPT | Mod: CPTII,S$GLB,, | Performed by: INTERNAL MEDICINE

## 2024-02-20 PROCEDURE — 3051F HG A1C>EQUAL 7.0%<8.0%: CPT | Mod: CPTII,S$GLB,, | Performed by: INTERNAL MEDICINE

## 2024-02-20 PROCEDURE — 3008F BODY MASS INDEX DOCD: CPT | Mod: CPTII,S$GLB,, | Performed by: INTERNAL MEDICINE

## 2024-02-20 PROCEDURE — 82962 GLUCOSE BLOOD TEST: CPT | Mod: ,,, | Performed by: INTERNAL MEDICINE

## 2024-02-20 PROCEDURE — 4010F ACE/ARB THERAPY RXD/TAKEN: CPT | Mod: CPTII,S$GLB,, | Performed by: INTERNAL MEDICINE

## 2024-02-20 PROCEDURE — 3288F FALL RISK ASSESSMENT DOCD: CPT | Mod: CPTII,S$GLB,, | Performed by: INTERNAL MEDICINE

## 2024-02-20 NOTE — PROGRESS NOTES
Subjective:      Patient ID: Deepali Madrid is a 73 y.o. female.    Chief Complaint: Follow-up    :  Patient with diabetes with last A1c of 7.3 is at goal.  Patient is on Trulicity, metformin and glimepiride.  Home blood sugars are not being monitored.  Patient denies any low blood sugar episode sewed.  Patient denies polyuria polydipsia, numbness in hands or feet patient has hypertension and blood pressure seem under good control.  Patient has hyperlipidemia and last LDL of 70 is at goal.    Patient has history of factor V laden mutation heterozygous and has history of DVT popliteal vein in August 2018.  Plan was to continue anticoagulation but anticoagulation was stopped by patient and continued aspirin for anticoagulation.  Patient denies any more thromboembolic event.  Patient also has hyperparathyroidism and was evaluated by endocrinologist previously.  Patient declined referral to endocrinologist    Review of Systems   Constitutional:  Negative for chills, diaphoresis, fever, malaise/fatigue and weight loss.   HENT:  Negative for congestion, ear pain, sinus pain, sore throat and tinnitus.    Eyes:  Negative for blurred vision and photophobia.   Respiratory:  Negative for cough, hemoptysis, shortness of breath and wheezing.    Cardiovascular:  Negative for chest pain, palpitations, orthopnea, leg swelling and PND.   Gastrointestinal:  Negative for abdominal pain, blood in stool, constipation, diarrhea, heartburn, melena, nausea and vomiting.   Genitourinary:  Negative for dysuria, frequency and urgency.   Musculoskeletal:  Negative for back pain, myalgias and neck pain.   Skin:  Negative for rash.   Neurological:  Negative for dizziness, tremors, seizures, loss of consciousness and weakness.   Endo/Heme/Allergies:  Negative for polydipsia.   Psychiatric/Behavioral:  Negative for depression and hallucinations. The patient does not have insomnia.      Objective:   /76 (BP Location: Right arm, Patient  "Position: Sitting, BP Method: Small (Automatic))   Pulse 75   Resp 18   Ht 5' 8" (1.727 m)   Wt 70 kg (154 lb 6.4 oz)   SpO2 96%   BMI 23.48 kg/m²     Physical Exam:  Patient not examined  Assessment:       ICD-10-CM ICD-9-CM   1. Type 2 diabetes mellitus without complication, without long-term current use of insulin  E11.9 250.00   2. History of DVT of lower extremity  Z86.718 V12.51   3. Hyperparathyroidism  E21.3 252.00   4. Factor V Leiden mutation  D68.51 289.81   5. Type 2 diabetes mellitus without complication, without long-term current use of insulin  E11.9 250.00       Plan:     Patient with diabetes with last A1c of 7.3 is at goal.  Will continue same medication  Will repeat labs on return  Patient has factor 5 laden mutation and history of lower extremity DVT  Patient tolerated Eliquis but the medication was stopped later and is on aspirin only for anticoagulation  Will continue aspirin  Patient has hyperparathyroidism and was evaluated by endocrinologist but refused to see endocrinologist  Patient declined DEXA scan, colonoscopy.  Patient has hypertension and blood pressure seem under good control    Medication List with Changes/Refills   Current Medications    ASPIRIN (ECOTRIN) 81 MG EC TABLET    aspirin 81 mg tablet,delayed release   Take 1 tablet every other day by oral route.    CALCIUM CARBONATE-VIT D3- MG CALCIUM- 400 UNIT TAB    Take 1 tablet by mouth once daily.    DULAGLUTIDE (TRULICITY) 3 MG/0.5 ML PEN INJECTOR    Inject 3 mg into the skin every 7 days.    GLIMEPIRIDE (AMARYL) 2 MG TABLET    Take 2 mg by mouth before breakfast.    LISINOPRIL (PRINIVIL,ZESTRIL) 20 MG TABLET    Take 1 tablet (20 mg total) by mouth once daily.    METFORMIN (GLUCOPHAGE) 1000 MG TABLET    Take 1 tablet (1,000 mg total) by mouth 2 (two) times daily.    PRAVASTATIN (PRAVACHOL) 40 MG TABLET    Take 1 tablet (40 mg total) by mouth once daily.        "

## 2024-05-20 DIAGNOSIS — E11.9 TYPE 2 DIABETES MELLITUS WITHOUT COMPLICATION, WITHOUT LONG-TERM CURRENT USE OF INSULIN: ICD-10-CM

## 2024-05-20 RX ORDER — DULAGLUTIDE 3 MG/.5ML
3 INJECTION, SOLUTION SUBCUTANEOUS
Qty: 12 PEN | Refills: 3 | Status: SHIPPED | OUTPATIENT
Start: 2024-05-20 | End: 2025-05-20

## 2024-05-20 RX ORDER — GLIMEPIRIDE 2 MG/1
2 TABLET ORAL
Qty: 90 TABLET | Refills: 3 | Status: SHIPPED | OUTPATIENT
Start: 2024-05-20 | End: 2025-05-20

## 2024-05-20 NOTE — TELEPHONE ENCOUNTER
----- Message from Dorothyjeanmarie Pena sent at 5/20/2024 11:01 AM CDT -----  Contact: Patient  Patient called to consult with nurse or staff regarding an order to renew medication. She states the pharmacy sent this request some time last week but hasn't gotten a reply from the office. She wanted to check on the status of this because she is completely out of her insulin medication. She would like a call back regarding this and can be reached at [phone]. Thanks/MR

## 2024-05-22 LAB
ALBUMIN SERPL BCP-MCNC: 3.9 G/DL (ref 3.4–5)
ALBUMIN/GLOBULIN RATIO: 0.95 RATIO
ALP SERPL-CCNC: 84 U/L (ref 45–117)
ALT SERPL W P-5'-P-CCNC: 32 U/L (ref 13–56)
ANION GAP SERPL CALC-SCNC: 9 MMOL/L (ref 3–11)
AST SERPL-CCNC: 35 U/L (ref 15–37)
BILIRUB SERPL-MCNC: 1.2 MG/DL (ref 0.2–1)
BUN SERPL-MCNC: 16 MG/DL (ref 7–18)
BUN/CREAT SERPL: 16.84 RATIO
CALCIUM SERPL-MCNC: 12.1 MG/DL (ref 8.5–10.1)
CHLORIDE SERPL-SCNC: 104 MMOL/L (ref 98–107)
CO2 SERPL-SCNC: 25 MMOL/L (ref 21–32)
CREAT SERPL-MCNC: 0.95 MG/DL (ref 0.55–1.02)
GFR ESTIMATION: 58
GLOBULIN: 4.1 G/DL
GLUCOSE SERPL-MCNC: 248 MG/DL (ref 74–106)
HBA1C MFR BLD: 7.4 % (ref 4.2–6.3)
POTASSIUM SERPL-SCNC: 4.9 MMOL/L (ref 3.5–5.1)
PROT SERPL-MCNC: 8 G/DL (ref 6.4–8.2)
PTH, INTACT: 131.1 PG/ML (ref 18.5–88)
SODIUM BLD-SCNC: 138 MMOL/L (ref 131–143)

## 2024-05-30 ENCOUNTER — OFFICE VISIT (OUTPATIENT)
Dept: PRIMARY CARE CLINIC | Facility: CLINIC | Age: 74
End: 2024-05-30
Payer: MEDICARE

## 2024-05-30 VITALS
HEIGHT: 68 IN | WEIGHT: 152 LBS | RESPIRATION RATE: 16 BRPM | BODY MASS INDEX: 23.04 KG/M2 | OXYGEN SATURATION: 96 % | SYSTOLIC BLOOD PRESSURE: 106 MMHG | HEART RATE: 70 BPM | TEMPERATURE: 98 F | DIASTOLIC BLOOD PRESSURE: 71 MMHG

## 2024-05-30 DIAGNOSIS — E11.9 TYPE 2 DIABETES MELLITUS WITHOUT COMPLICATION, WITHOUT LONG-TERM CURRENT USE OF INSULIN: Primary | ICD-10-CM

## 2024-05-30 DIAGNOSIS — Z78.0 POST-MENOPAUSAL: ICD-10-CM

## 2024-05-30 DIAGNOSIS — Z12.31 BREAST CANCER SCREENING BY MAMMOGRAM: ICD-10-CM

## 2024-05-30 LAB — GLUCOSE SERPL-MCNC: 152 MG/DL (ref 70–110)

## 2024-05-30 PROCEDURE — 1159F MED LIST DOCD IN RCRD: CPT | Mod: CPTII,S$GLB,, | Performed by: INTERNAL MEDICINE

## 2024-05-30 PROCEDURE — 1160F RVW MEDS BY RX/DR IN RCRD: CPT | Mod: CPTII,S$GLB,, | Performed by: INTERNAL MEDICINE

## 2024-05-30 PROCEDURE — 3008F BODY MASS INDEX DOCD: CPT | Mod: CPTII,S$GLB,, | Performed by: INTERNAL MEDICINE

## 2024-05-30 PROCEDURE — 1100F PTFALLS ASSESS-DOCD GE2>/YR: CPT | Mod: CPTII,S$GLB,, | Performed by: INTERNAL MEDICINE

## 2024-05-30 PROCEDURE — 3288F FALL RISK ASSESSMENT DOCD: CPT | Mod: CPTII,S$GLB,, | Performed by: INTERNAL MEDICINE

## 2024-05-30 PROCEDURE — 3051F HG A1C>EQUAL 7.0%<8.0%: CPT | Mod: CPTII,S$GLB,, | Performed by: INTERNAL MEDICINE

## 2024-05-30 PROCEDURE — 3078F DIAST BP <80 MM HG: CPT | Mod: CPTII,S$GLB,, | Performed by: INTERNAL MEDICINE

## 2024-05-30 PROCEDURE — 82962 GLUCOSE BLOOD TEST: CPT | Mod: ,,, | Performed by: INTERNAL MEDICINE

## 2024-05-30 PROCEDURE — 3074F SYST BP LT 130 MM HG: CPT | Mod: CPTII,S$GLB,, | Performed by: INTERNAL MEDICINE

## 2024-05-30 PROCEDURE — 4010F ACE/ARB THERAPY RXD/TAKEN: CPT | Mod: CPTII,S$GLB,, | Performed by: INTERNAL MEDICINE

## 2024-05-30 PROCEDURE — 99214 OFFICE O/P EST MOD 30 MIN: CPT | Mod: S$GLB,,, | Performed by: INTERNAL MEDICINE

## 2024-05-30 NOTE — PROGRESS NOTES
Subjective:      Patient ID: Deepali Madrid is a 73 y.o. female.    Chief Complaint: Diabetes (3 month f/u )    :  Diabetes with last A1c of 7.4 is at goal.  Patient is on Trulicity metformin and glimepiride.  Home blood sugars are not being monitored.  Patient denies any hypoglycemic episodes.  Patient also has hypertension and is on lisinopril.  Patient blood pressure seem under good control.  Patient last LDL of 70 is at goal    Patient has factor 5 laden mutation heterozygous and has history of DVT popliteal vein in August 2018.  Patient was recommended to continue anticoagulation but patient stopped blood thinners and is using aspirin only.  Patient has osteoporosis, patient did not tolerate bisphosphonate and was recommended to use Prolia but declined by patient.  Patient also has hypothyroidism and was evaluated by endocrinologist previously but is not being followed by one at this time.  Patient does not want another refer.     Review of Systems   Constitutional:  Negative for chills, diaphoresis, fever, malaise/fatigue and weight loss.   HENT:  Negative for congestion, ear pain, sinus pain, sore throat and tinnitus.    Eyes:  Negative for blurred vision and photophobia.   Respiratory:  Negative for cough, hemoptysis, shortness of breath and wheezing.    Cardiovascular:  Negative for chest pain, palpitations, orthopnea, leg swelling and PND.   Gastrointestinal:  Negative for abdominal pain, blood in stool, constipation, diarrhea, heartburn, melena, nausea and vomiting.   Genitourinary:  Negative for dysuria, frequency and urgency.   Musculoskeletal:  Negative for back pain, myalgias and neck pain.   Skin:  Negative for rash.   Neurological:  Negative for dizziness, tremors, seizures, loss of consciousness and weakness.   Endo/Heme/Allergies:  Negative for polydipsia.   Psychiatric/Behavioral:  Negative for depression and hallucinations. The patient does not have insomnia.      Objective:   /71 (BP  "Location: Left arm, Patient Position: Sitting, BP Method: Medium (Automatic))   Pulse 70   Temp 98.1 °F (36.7 °C) (Oral)   Resp 16   Ht 5' 8" (1.727 m)   Wt 68.9 kg (152 lb)   SpO2 96%   BMI 23.11 kg/m²     Physical Exam: Patient not examined   Assessment:       ICD-10-CM ICD-9-CM   1. Type 2 diabetes mellitus without complication, without long-term current use of insulin  E11.9 250.00   2. Breast cancer screening by mammogram  Z12.31 V76.12   3. Post-menopausal  Z78.0 V49.81       Plan:     Patient with diabetes with last A1c of 7.4 is at goal.  Will continue same medication.  Patient blood pressures are under good control.  Will continue medication  Patient has hyperlipidemia and last LDL is at goal  Will repeat labs on return  Will order mammogram and DEXA scan.    Medication List with Changes/Refills   Current Medications    ASPIRIN (ECOTRIN) 81 MG EC TABLET    aspirin 81 mg tablet,delayed release   Take 1 tablet every other day by oral route.    CALCIUM CARBONATE-VIT D3- MG CALCIUM- 400 UNIT TAB    Take 1 tablet by mouth once daily.    DULAGLUTIDE (TRULICITY) 3 MG/0.5 ML PEN INJECTOR    Inject 3 mg into the skin every 7 days.    GLIMEPIRIDE (AMARYL) 2 MG TABLET    Take 1 tablet (2 mg total) by mouth before breakfast.    LISINOPRIL (PRINIVIL,ZESTRIL) 20 MG TABLET    Take 1 tablet (20 mg total) by mouth once daily.    METFORMIN (GLUCOPHAGE) 1000 MG TABLET    Take 1 tablet (1,000 mg total) by mouth 2 (two) times daily.    PRAVASTATIN (PRAVACHOL) 40 MG TABLET    Take 1 tablet (40 mg total) by mouth once daily.        "

## 2024-08-19 DIAGNOSIS — I10 PRIMARY HYPERTENSION: ICD-10-CM

## 2024-08-19 DIAGNOSIS — E11.9 TYPE 2 DIABETES MELLITUS WITHOUT COMPLICATION, WITHOUT LONG-TERM CURRENT USE OF INSULIN: ICD-10-CM

## 2024-08-19 RX ORDER — METFORMIN HYDROCHLORIDE 1000 MG/1
1000 TABLET ORAL 2 TIMES DAILY
Qty: 180 TABLET | Refills: 3 | Status: SHIPPED | OUTPATIENT
Start: 2024-08-19 | End: 2025-08-19

## 2024-08-19 RX ORDER — LISINOPRIL 20 MG/1
20 TABLET ORAL DAILY
Qty: 90 TABLET | Refills: 3 | Status: SHIPPED | OUTPATIENT
Start: 2024-08-19 | End: 2025-08-19

## 2024-09-24 ENCOUNTER — CLINICAL SUPPORT (OUTPATIENT)
Dept: OBSTETRICS AND GYNECOLOGY | Facility: CLINIC | Age: 74
End: 2024-09-24
Payer: MEDICARE

## 2024-09-24 DIAGNOSIS — Z01.89 ROUTINE LAB DRAW: Primary | ICD-10-CM

## 2024-09-24 LAB
ALBUMIN SERPL BCP-MCNC: 4 G/DL (ref 3.4–5)
ALBUMIN/GLOBULIN RATIO: 0.97 RATIO (ref 1.1–1.8)
ALP SERPL-CCNC: 92 U/L (ref 45–117)
ALT SERPL W P-5'-P-CCNC: 43 U/L (ref 13–56)
ANION GAP SERPL CALC-SCNC: 9 MMOL/L (ref 3–11)
AST SERPL-CCNC: 72 U/L (ref 15–37)
BASOPHILS NFR BLD: 0.3 % (ref 0–3)
BILIRUB SERPL-MCNC: 1.2 MG/DL (ref 0.2–1)
BUN SERPL-MCNC: 14 MG/DL (ref 7–18)
BUN/CREAT SERPL: 11.96 RATIO
CALCIUM SERPL-MCNC: 12.2 MG/DL (ref 8.5–10.1)
CHLORIDE SERPL-SCNC: 104 MMOL/L (ref 98–107)
CHOLEST SERPL-MSCNC: 152 MG/DL
CO2 SERPL-SCNC: 25 MMOL/L (ref 21–32)
CREAT SERPL-MCNC: 1.17 MG/DL (ref 0.55–1.02)
CREATININE, URINE: 335.9 MG/DL (ref 10–175)
EOSINOPHIL NFR BLD: 0.9 % (ref 0–6)
ERYTHROCYTE [DISTWIDTH] IN BLOOD BY AUTOMATED COUNT: 13.5 % (ref 0–15.5)
GFR ESTIMATION: 45
GLOBULIN: 4.1 G/DL (ref 2.3–3.5)
GLUCOSE SERPL-MCNC: 238 MG/DL (ref 74–106)
HBA1C MFR BLD: 9.6 % (ref 4.2–6.3)
HCT VFR BLD AUTO: 43.9 % (ref 37–47)
HDL/CHOLESTEROL RATIO: 3.5 RATIO
HDLC SERPL-MCNC: 43 MG/DL
HGB BLD-MCNC: 14.4 G/DL (ref 12–16)
LDLC SERPL CALC-MCNC: 78.6 MG/DL
LYMPHOCYTES NFR BLD: 36.6 % (ref 20–45)
MCH RBC QN AUTO: 30.1 PG (ref 27–32)
MCHC RBC AUTO-ENTMCNC: 32.8 % (ref 32–36)
MCV RBC AUTO: 91.8 FL (ref 80–99)
MICROALBUMIN URINE: 74.5 MG/L (ref 0–20)
MICROALBUMIN/CREATININE RATIO: 22 MG/G (ref 0–30)
MONOCYTES NFR BLD: 5.9 % (ref 2–10)
NEUTROPHILS # BLD AUTO: 3.7 10*3/UL (ref 1.4–7)
NEUTROPHILS NFR BLD: 56.1 % (ref 50–80)
NUCLEATED RED BLOOD CELLS: 0 % (ref 0–0.2)
PLATELETS: 183 10*3/UL (ref 130–400)
POTASSIUM SERPL-SCNC: 5 MMOL/L (ref 3.5–5.1)
PROT SERPL-MCNC: 8.1 G/DL (ref 6.4–8.2)
RBC # BLD AUTO: 4.78 10*6/UL (ref 4.2–5.4)
SODIUM BLD-SCNC: 138 MMOL/L (ref 131–143)
TRIGL SERPL-MCNC: 152 MG/DL (ref 0–149)
TSH SERPL DL<=0.005 MIU/L-ACNC: 1.17 UIU/ML (ref 0.36–3.74)
VLDL CHOLESTEROL: 30 MG/DL
WBC # BLD: 6.6 10*3/UL (ref 4.5–10)

## 2024-09-27 ENCOUNTER — TELEPHONE (OUTPATIENT)
Dept: OBSTETRICS AND GYNECOLOGY | Facility: CLINIC | Age: 74
End: 2024-09-27
Payer: MEDICARE

## 2024-09-27 NOTE — TELEPHONE ENCOUNTER
Returned call to patient. Informed her that her appointment is with Dr. Anderson on Monday, not Dr. Madrid. Patient verbalized understanding.

## 2024-09-27 NOTE — TELEPHONE ENCOUNTER
----- Message from Fidel Srinivasan sent at 9/27/2024  9:50 AM CDT -----  Contact: self [phone]  Type:  Patient Returning Call    Who Called:Deepali   Who Left Message for Patient:nurse   Does the patient know what this is regarding?:no  Would the patient rather a call back or a response via [employer]? Call back   Best Call Back Number:[phone]  Additional Information:

## 2024-09-30 ENCOUNTER — OFFICE VISIT (OUTPATIENT)
Dept: PRIMARY CARE CLINIC | Facility: CLINIC | Age: 74
End: 2024-09-30
Payer: MEDICARE

## 2024-09-30 VITALS
DIASTOLIC BLOOD PRESSURE: 75 MMHG | BODY MASS INDEX: 22.83 KG/M2 | WEIGHT: 150.63 LBS | RESPIRATION RATE: 18 BRPM | HEIGHT: 68 IN | HEART RATE: 67 BPM | SYSTOLIC BLOOD PRESSURE: 112 MMHG | OXYGEN SATURATION: 95 % | TEMPERATURE: 97 F

## 2024-09-30 DIAGNOSIS — N18.31 CHRONIC KIDNEY DISEASE, STAGE 3A: ICD-10-CM

## 2024-09-30 DIAGNOSIS — I10 PRIMARY HYPERTENSION: ICD-10-CM

## 2024-09-30 DIAGNOSIS — E11.9 TYPE 2 DIABETES MELLITUS WITHOUT COMPLICATION, WITHOUT LONG-TERM CURRENT USE OF INSULIN: Primary | ICD-10-CM

## 2024-09-30 DIAGNOSIS — E21.3 HYPERPARATHYROIDISM: ICD-10-CM

## 2024-09-30 LAB — GLUCOSE SERPL-MCNC: 198 MG/DL (ref 70–110)

## 2024-09-30 PROCEDURE — 3074F SYST BP LT 130 MM HG: CPT | Mod: CPTII,,, | Performed by: INTERNAL MEDICINE

## 2024-09-30 PROCEDURE — 3046F HEMOGLOBIN A1C LEVEL >9.0%: CPT | Mod: CPTII,,, | Performed by: INTERNAL MEDICINE

## 2024-09-30 PROCEDURE — 1160F RVW MEDS BY RX/DR IN RCRD: CPT | Mod: CPTII,,, | Performed by: INTERNAL MEDICINE

## 2024-09-30 PROCEDURE — 3078F DIAST BP <80 MM HG: CPT | Mod: CPTII,,, | Performed by: INTERNAL MEDICINE

## 2024-09-30 PROCEDURE — 3061F NEG MICROALBUMINURIA REV: CPT | Mod: CPTII,,, | Performed by: INTERNAL MEDICINE

## 2024-09-30 PROCEDURE — 3288F FALL RISK ASSESSMENT DOCD: CPT | Mod: CPTII,,, | Performed by: INTERNAL MEDICINE

## 2024-09-30 PROCEDURE — 3066F NEPHROPATHY DOC TX: CPT | Mod: CPTII,,, | Performed by: INTERNAL MEDICINE

## 2024-09-30 PROCEDURE — 4010F ACE/ARB THERAPY RXD/TAKEN: CPT | Mod: CPTII,,, | Performed by: INTERNAL MEDICINE

## 2024-09-30 PROCEDURE — 3008F BODY MASS INDEX DOCD: CPT | Mod: CPTII,,, | Performed by: INTERNAL MEDICINE

## 2024-09-30 PROCEDURE — 1159F MED LIST DOCD IN RCRD: CPT | Mod: CPTII,,, | Performed by: INTERNAL MEDICINE

## 2024-09-30 PROCEDURE — 99214 OFFICE O/P EST MOD 30 MIN: CPT | Mod: S$PBB,,, | Performed by: INTERNAL MEDICINE

## 2024-09-30 PROCEDURE — 1101F PT FALLS ASSESS-DOCD LE1/YR: CPT | Mod: CPTII,,, | Performed by: INTERNAL MEDICINE

## 2024-09-30 RX ORDER — GLIMEPIRIDE 4 MG/1
4 TABLET ORAL
Qty: 90 TABLET | Refills: 3 | Status: SHIPPED | OUTPATIENT
Start: 2024-09-30 | End: 2025-09-30

## 2024-09-30 RX ORDER — ATORVASTATIN CALCIUM 40 MG/1
40 TABLET, FILM COATED ORAL DAILY
Qty: 90 TABLET | Refills: 3 | Status: SHIPPED | OUTPATIENT
Start: 2024-09-30 | End: 2025-09-30

## 2024-09-30 NOTE — PROGRESS NOTES
"  Subjective:      Patient ID: Deepali Madrid is a 73 y.o. female.    Chief Complaint: Diabetes (4 month f/u )    :  With diabetes with last A1c of 9.6 worsen from 7.4 secondary to missing medication.  Patient has started taking medication again.  Patient reports random blood sugars are running between 180-200.  Patient denies any hypoglycemic episodes.  Patient has hypertension and is on lisinopril.  Patient blood pressure seem under good control.  Patient last LDL of 78 is not at goal target is less than 70.  Patient is on pravastatin 40 mg.  Will change it to atorvastatin.    Patient has chronic kidney disease with last GFR of 45.  Patient kidney function seems to be deteriorating and is not being followed by nephrologist.       Review of Systems   Constitutional:  Negative for chills, diaphoresis, fever, malaise/fatigue and weight loss.   HENT:  Negative for congestion, ear pain, sinus pain, sore throat and tinnitus.    Eyes:  Negative for blurred vision and photophobia.   Respiratory:  Negative for cough, hemoptysis, shortness of breath and wheezing.    Cardiovascular:  Negative for chest pain, palpitations, orthopnea, leg swelling and PND.   Gastrointestinal:  Negative for abdominal pain, blood in stool, constipation, diarrhea, heartburn, melena, nausea and vomiting.   Genitourinary:  Negative for dysuria, frequency and urgency.   Musculoskeletal:  Negative for back pain, myalgias and neck pain.   Skin:  Negative for rash.   Neurological:  Negative for dizziness, tremors, seizures, loss of consciousness and weakness.   Endo/Heme/Allergies:  Negative for polydipsia.   Psychiatric/Behavioral:  Negative for depression and hallucinations. The patient does not have insomnia.      Objective:   /75 (BP Location: Right arm, Patient Position: Sitting)   Pulse 67   Temp 97.4 °F (36.3 °C) (Oral)   Resp 18   Ht 5' 8" (1.727 m)   Wt 68.3 kg (150 lb 9.6 oz)   SpO2 95%   BMI 22.90 kg/m²     Physical Exam:  Patient " not examined  Assessment:       ICD-10-CM ICD-9-CM   1. Type 2 diabetes mellitus without complication, without long-term current use of insulin  E11.9 250.00   2. Hyperparathyroidism  E21.3 252.00   3. Primary hypertension  I10 401.9   4. Chronic kidney disease, stage 3a  N18.31 585.3       Plan:     Patient with diabetes with last A1c of 9.6 worsen from 7.4 secondary to missing medication  Patient fasting blood sugars are running high as well.  Will increase glimepiride to 4 mg  Advised patient to monitor blood sugars at home and bring log.  Patient has hypertension and blood pressure seem under good control  Will continue lisinopril  Patient has hyperlipidemia with last LDL of 78 is slightly higher than target  Will change pravastatin to atorvastatin  Patient has chronic kidney disease with last GFR of 45..  Will refer to nephrologist  Patient declined colonoscopy again  Patient declined all vaccines again    Medication List with Changes/Refills   Current Medications    ASPIRIN (ECOTRIN) 81 MG EC TABLET    aspirin 81 mg tablet,delayed release   Take 1 tablet every other day by oral route.    CALCIUM CARBONATE-VIT D3- MG CALCIUM- 400 UNIT TAB    Take 1 tablet by mouth once daily.    DULAGLUTIDE (TRULICITY) 3 MG/0.5 ML PEN INJECTOR    Inject 3 mg into the skin every 7 days.    LISINOPRIL (PRINIVIL,ZESTRIL) 20 MG TABLET    Take 1 tablet (20 mg total) by mouth once daily.    METFORMIN (GLUCOPHAGE) 1000 MG TABLET    Take 1 tablet (1,000 mg total) by mouth 2 (two) times daily.    PRAVASTATIN (PRAVACHOL) 40 MG TABLET    Take 1 tablet (40 mg total) by mouth once daily.   Discontinued Medications    GLIMEPIRIDE (AMARYL) 2 MG TABLET    Take 1 tablet (2 mg total) by mouth before breakfast.

## 2024-10-10 ENCOUNTER — TELEPHONE (OUTPATIENT)
Dept: PRIMARY CARE CLINIC | Facility: CLINIC | Age: 74
End: 2024-10-10

## 2024-10-10 NOTE — TELEPHONE ENCOUNTER
PA COMPLETED FOR TRULICITY AND SENT TO HER INS--NOW WAITING ON A REPLY WHICH CAN TAKE UP TO 72 HRS

## 2024-10-10 NOTE — TELEPHONE ENCOUNTER
PT'S INS HAS APPROVED THE REQUEST FOR TRULICITY FROM 10/01/24-12/31/2024--PT'S PHARMACY HAS BEEN INFORMED

## 2024-10-17 ENCOUNTER — TELEPHONE (OUTPATIENT)
Dept: PRIMARY CARE CLINIC | Facility: CLINIC | Age: 74
End: 2024-10-17

## 2024-10-17 NOTE — TELEPHONE ENCOUNTER
Pt informed that her request for Trulicity has been approved by her ins--she can call her pharmacy and find out when she is able to

## 2024-10-18 LAB
LEFT EYE DM RETINOPATHY: NEGATIVE
RIGHT EYE DM RETINOPATHY: NEGATIVE

## 2024-11-07 ENCOUNTER — OFFICE VISIT (OUTPATIENT)
Dept: PRIMARY CARE CLINIC | Facility: CLINIC | Age: 74
End: 2024-11-07
Payer: MEDICARE

## 2024-11-07 VITALS
SYSTOLIC BLOOD PRESSURE: 117 MMHG | WEIGHT: 152.19 LBS | HEIGHT: 68 IN | DIASTOLIC BLOOD PRESSURE: 77 MMHG | RESPIRATION RATE: 18 BRPM | HEART RATE: 68 BPM | BODY MASS INDEX: 23.06 KG/M2 | OXYGEN SATURATION: 96 % | TEMPERATURE: 98 F

## 2024-11-07 DIAGNOSIS — I10 PRIMARY HYPERTENSION: ICD-10-CM

## 2024-11-07 DIAGNOSIS — E11.9 TYPE 2 DIABETES MELLITUS WITHOUT COMPLICATION, WITHOUT LONG-TERM CURRENT USE OF INSULIN: Primary | ICD-10-CM

## 2024-11-07 DIAGNOSIS — N18.31 CHRONIC KIDNEY DISEASE, STAGE 3A: ICD-10-CM

## 2024-11-07 LAB — GLUCOSE SERPL-MCNC: 207 MG/DL (ref 70–110)

## 2024-11-07 PROCEDURE — 3288F FALL RISK ASSESSMENT DOCD: CPT | Mod: CPTII,,, | Performed by: INTERNAL MEDICINE

## 2024-11-07 PROCEDURE — 3061F NEG MICROALBUMINURIA REV: CPT | Mod: CPTII,,, | Performed by: INTERNAL MEDICINE

## 2024-11-07 PROCEDURE — 3066F NEPHROPATHY DOC TX: CPT | Mod: CPTII,,, | Performed by: INTERNAL MEDICINE

## 2024-11-07 PROCEDURE — 3008F BODY MASS INDEX DOCD: CPT | Mod: CPTII,,, | Performed by: INTERNAL MEDICINE

## 2024-11-07 PROCEDURE — 2023F DILAT RTA XM W/O RTNOPTHY: CPT | Mod: CPTII,,, | Performed by: INTERNAL MEDICINE

## 2024-11-07 PROCEDURE — 99214 OFFICE O/P EST MOD 30 MIN: CPT | Mod: S$PBB,,, | Performed by: INTERNAL MEDICINE

## 2024-11-07 PROCEDURE — 3078F DIAST BP <80 MM HG: CPT | Mod: CPTII,,, | Performed by: INTERNAL MEDICINE

## 2024-11-07 PROCEDURE — 1160F RVW MEDS BY RX/DR IN RCRD: CPT | Mod: CPTII,,, | Performed by: INTERNAL MEDICINE

## 2024-11-07 PROCEDURE — 3074F SYST BP LT 130 MM HG: CPT | Mod: CPTII,,, | Performed by: INTERNAL MEDICINE

## 2024-11-07 PROCEDURE — 3046F HEMOGLOBIN A1C LEVEL >9.0%: CPT | Mod: CPTII,,, | Performed by: INTERNAL MEDICINE

## 2024-11-07 PROCEDURE — 1159F MED LIST DOCD IN RCRD: CPT | Mod: CPTII,,, | Performed by: INTERNAL MEDICINE

## 2024-11-07 PROCEDURE — 1101F PT FALLS ASSESS-DOCD LE1/YR: CPT | Mod: CPTII,,, | Performed by: INTERNAL MEDICINE

## 2024-11-07 PROCEDURE — 4010F ACE/ARB THERAPY RXD/TAKEN: CPT | Mod: CPTII,,, | Performed by: INTERNAL MEDICINE

## 2024-11-07 RX ORDER — DAPAGLIFLOZIN 5 MG/1
5 TABLET, FILM COATED ORAL DAILY
Qty: 30 TABLET | Refills: 2 | Status: SHIPPED | OUTPATIENT
Start: 2024-11-07

## 2024-11-07 NOTE — PROGRESS NOTES
"  Subjective:      Patient ID: Deepali Madrid is a 74 y.o. female.    Chief Complaint: Diabetes (1 month f/u )    :  Diabetes with last A1c of 9.6 worsen from 7.4 probably secondary to missing medication.  Patient has started taking medication more regularly.  Patient reports random blood sugars are running between 180-220.  Patient denies any hypoglycemic episodes.  Patient has hypertension and blood pressure seem under good control.  Patient has chronic kidney disease with last GFR of 45 worsen from 58.  Patient reports she is trying her best to adhere with diet.    Review of Systems   Constitutional:  Negative for chills, diaphoresis, fever, malaise/fatigue and weight loss.   HENT:  Negative for congestion, ear pain, sinus pain, sore throat and tinnitus.    Eyes:  Negative for blurred vision and photophobia.   Respiratory:  Negative for cough, hemoptysis, shortness of breath and wheezing.    Cardiovascular:  Negative for chest pain, palpitations, orthopnea, leg swelling and PND.   Gastrointestinal:  Negative for abdominal pain, blood in stool, constipation, diarrhea, heartburn, melena, nausea and vomiting.   Genitourinary:  Negative for dysuria, frequency and urgency.   Musculoskeletal:  Negative for back pain, myalgias and neck pain.   Skin:  Negative for rash.   Neurological:  Negative for dizziness, tremors, seizures, loss of consciousness and weakness.   Endo/Heme/Allergies:  Negative for polydipsia.   Psychiatric/Behavioral:  Negative for depression and hallucinations. The patient does not have insomnia.      Objective:   /77 (BP Location: Left arm, Patient Position: Sitting)   Pulse 68   Temp 98 °F (36.7 °C) (Oral)   Resp 18   Ht 5' 8" (1.727 m)   Wt 69 kg (152 lb 3.2 oz)   SpO2 96%   BMI 23.14 kg/m²     Physical Exam: Patient is vitally stable with no acute distress    Assessment:       ICD-10-CM ICD-9-CM   1. Type 2 diabetes mellitus without complication, without long-term current use of insulin "  E11.9 250.00   2. Chronic kidney disease, stage 3a  N18.31 585.3   3. Primary hypertension  I10 401.9       Plan:     Patient with diabetes with last A1c of 9.6 is not at goal  Home blood sugars are running high.   Will add Farxiga, Medicine should help with diabetes  And also slow down chronic kidney disease progression  Patient blood pressures are under good control will continue lisinopril  Patient has progressive kidney function declined >60 ---> 58---> 45  Will repeat CMP    Patient refused flu vaccine + Colonoscopy    Medication List with Changes/Refills   New Medications    DAPAGLIFLOZIN PROPANEDIOL (FARXIGA) 5 MG TAB TABLET    Take 1 tablet (5 mg total) by mouth once daily.   Current Medications    ASPIRIN (ECOTRIN) 81 MG EC TABLET    aspirin 81 mg tablet,delayed release   Take 1 tablet every other day by oral route.    ATORVASTATIN (LIPITOR) 40 MG TABLET    Take 1 tablet (40 mg total) by mouth once daily.    CALCIUM CARBONATE-VIT D3- MG CALCIUM- 400 UNIT TAB    Take 1 tablet by mouth once daily.    DULAGLUTIDE (TRULICITY) 3 MG/0.5 ML PEN INJECTOR    Inject 3 mg into the skin every 7 days.    GLIMEPIRIDE (AMARYL) 4 MG TABLET    Take 1 tablet (4 mg total) by mouth before breakfast.    LISINOPRIL (PRINIVIL,ZESTRIL) 20 MG TABLET    Take 1 tablet (20 mg total) by mouth once daily.    METFORMIN (GLUCOPHAGE) 1000 MG TABLET    Take 1 tablet (1,000 mg total) by mouth 2 (two) times daily.

## 2025-01-06 ENCOUNTER — TELEPHONE (OUTPATIENT)
Dept: PRIMARY CARE CLINIC | Facility: CLINIC | Age: 75
End: 2025-01-06
Payer: MEDICARE

## 2025-01-06 NOTE — TELEPHONE ENCOUNTER
Returned call to patient, she states she has an upcoming appt and would like to reschedule to a later date.

## 2025-01-13 ENCOUNTER — CLINICAL SUPPORT (OUTPATIENT)
Dept: OBSTETRICS AND GYNECOLOGY | Facility: CLINIC | Age: 75
End: 2025-01-13
Payer: MEDICARE

## 2025-01-13 ENCOUNTER — OFFICE VISIT (OUTPATIENT)
Dept: PRIMARY CARE CLINIC | Facility: CLINIC | Age: 75
End: 2025-01-13
Payer: MEDICARE

## 2025-01-13 VITALS
WEIGHT: 150 LBS | RESPIRATION RATE: 16 BRPM | HEART RATE: 70 BPM | OXYGEN SATURATION: 97 % | TEMPERATURE: 98 F | SYSTOLIC BLOOD PRESSURE: 98 MMHG | DIASTOLIC BLOOD PRESSURE: 63 MMHG | HEIGHT: 68 IN | BODY MASS INDEX: 22.73 KG/M2

## 2025-01-13 DIAGNOSIS — N18.31 CHRONIC KIDNEY DISEASE, STAGE 3A: ICD-10-CM

## 2025-01-13 DIAGNOSIS — Z01.89 ROUTINE LAB DRAW: Primary | ICD-10-CM

## 2025-01-13 DIAGNOSIS — E11.9 TYPE 2 DIABETES MELLITUS WITHOUT COMPLICATION, WITHOUT LONG-TERM CURRENT USE OF INSULIN: Primary | ICD-10-CM

## 2025-01-13 LAB
ALBUMIN SERPL BCP-MCNC: 3.7 G/DL (ref 3.4–5)
ALBUMIN/GLOBULIN RATIO: 0.9 RATIO (ref 1.1–1.8)
ALP SERPL-CCNC: 96 U/L (ref 45–117)
ALT SERPL W P-5'-P-CCNC: 25 U/L (ref 13–56)
ANION GAP SERPL CALC-SCNC: 8 MMOL/L (ref 3–11)
AST SERPL-CCNC: 32 U/L (ref 15–37)
BILIRUB SERPL-MCNC: 1 MG/DL (ref 0.2–1)
BUN SERPL-MCNC: 16 MG/DL (ref 7–18)
BUN/CREAT SERPL: 19.04 RATIO
CALCIUM SERPL-MCNC: 11.7 MG/DL (ref 8.5–10.1)
CHLORIDE SERPL-SCNC: 106 MMOL/L (ref 98–107)
CHOLEST SERPL-MSCNC: 158 MG/DL
CO2 SERPL-SCNC: 26 MMOL/L (ref 21–32)
CREAT SERPL-MCNC: 0.84 MG/DL (ref 0.55–1.02)
GFR ESTIMATION: 73
GLOBULIN: 4.1 G/DL (ref 2.3–3.5)
GLUCOSE SERPL-MCNC: 194 MG/DL (ref 70–110)
GLUCOSE SERPL-MCNC: 224 MG/DL (ref 74–106)
HBA1C MFR BLD: 8.1 % (ref 4.2–6.3)
HDL/CHOLESTEROL RATIO: 4 RATIO
HDLC SERPL-MCNC: 40 MG/DL
LDLC SERPL CALC-MCNC: 87.6 MG/DL
POTASSIUM SERPL-SCNC: 4.6 MMOL/L (ref 3.5–5.1)
PROT SERPL-MCNC: 7.8 G/DL (ref 6.4–8.2)
SODIUM BLD-SCNC: 140 MMOL/L (ref 131–143)
TRIGL SERPL-MCNC: 152 MG/DL (ref 0–149)
VLDL CHOLESTEROL: 30 MG/DL

## 2025-01-13 PROCEDURE — 3008F BODY MASS INDEX DOCD: CPT | Mod: CPTII,,, | Performed by: INTERNAL MEDICINE

## 2025-01-13 PROCEDURE — 99214 OFFICE O/P EST MOD 30 MIN: CPT | Mod: S$PBB,,, | Performed by: INTERNAL MEDICINE

## 2025-01-13 PROCEDURE — 1159F MED LIST DOCD IN RCRD: CPT | Mod: CPTII,,, | Performed by: INTERNAL MEDICINE

## 2025-01-13 PROCEDURE — 1160F RVW MEDS BY RX/DR IN RCRD: CPT | Mod: CPTII,,, | Performed by: INTERNAL MEDICINE

## 2025-01-13 PROCEDURE — 3074F SYST BP LT 130 MM HG: CPT | Mod: CPTII,,, | Performed by: INTERNAL MEDICINE

## 2025-01-13 PROCEDURE — 3078F DIAST BP <80 MM HG: CPT | Mod: CPTII,,, | Performed by: INTERNAL MEDICINE

## 2025-01-13 PROCEDURE — 1125F AMNT PAIN NOTED PAIN PRSNT: CPT | Mod: CPTII,,, | Performed by: INTERNAL MEDICINE

## 2025-01-13 RX ORDER — DULAGLUTIDE 3 MG/.5ML
3 INJECTION, SOLUTION SUBCUTANEOUS
Qty: 12 PEN | Refills: 3 | Status: SHIPPED | OUTPATIENT
Start: 2025-01-13 | End: 2026-01-13

## 2025-01-13 NOTE — PROGRESS NOTES
"  Subjective:      Patient ID: Deepali Madrid is a 74 y.o. female.    Chief Complaint: Diabetes (2month f/u) and Dizziness (C/o episodes of dizziness when standing, unsteady gait)    :  With diabetes with last A1c of 9.6 suggest poorly-controlled blood sugar.  Home blood sugars are running less than 200.  Patient denies any hypoglycemic episode.  Patient was on Trulicity and Farxiga was added secondary to high blood sugar.  Patient has not yet started the medication.  I will send the prescription patient is on lisinopril and blood pressure seem to be on lower side.  Patient reports dizziness when changing position and standing up from sitting position.  Symptoms lasts for a few seconds and improved on its own.         Review of Systems   Constitutional:  Negative for chills, diaphoresis, fever, malaise/fatigue and weight loss.   HENT:  Negative for congestion, ear pain, sinus pain, sore throat and tinnitus.    Eyes:  Negative for blurred vision and photophobia.   Respiratory:  Negative for cough, hemoptysis, shortness of breath and wheezing.    Cardiovascular:  Negative for chest pain, palpitations, orthopnea, leg swelling and PND.   Gastrointestinal:  Negative for abdominal pain, blood in stool, constipation, diarrhea, heartburn, melena, nausea and vomiting.   Genitourinary:  Negative for dysuria, frequency and urgency.   Musculoskeletal:  Negative for back pain, myalgias and neck pain.   Skin:  Negative for rash.   Neurological:  Positive for dizziness. Negative for tremors, seizures, loss of consciousness and weakness.   Endo/Heme/Allergies:  Negative for polydipsia.   Psychiatric/Behavioral:  Negative for depression and hallucinations. The patient does not have insomnia.      Objective:   BP 98/63 (BP Location: Right arm, Patient Position: Sitting)   Pulse 70   Temp 98 °F (36.7 °C) (Oral)   Resp 16   Ht 5' 8" (1.727 m)   Wt 68 kg (150 lb)   SpO2 97%   BMI 22.81 kg/m²     Physical Exam: Patient is vitally " stable with no acute distress    Assessment:       ICD-10-CM ICD-9-CM   1. Type 2 diabetes mellitus without complication, without long-term current use of insulin  E11.9 250.00   2. Chronic kidney disease, stage 3a  N18.31 585.3       Plan:     Patient with diabetes with last A1c of 9.6 suggest poorly-controlled blood sugar  Patient reports home blood sugars are better controlled  Will repeat A1c and CMP  Patient labs suggested chronic kidney disease with last GFR of 45.  Will repeat kidney function  Patient is advised to start taking Farxiga  Patient has dizziness probably secondary to low blood pressure.   Farxiga can drop the blood pressure further  Will hold lisinopril at this time  Advised patient to keep herself hydrated  Will check urinalysis  Patient declined DEXA scan, colonoscopy, flu shot, anticoagulation.    Medication List with Changes/Refills   Current Medications    ASPIRIN (ECOTRIN) 81 MG EC TABLET    aspirin 81 mg tablet,delayed release   Take 1 tablet every other day by oral route.    ATORVASTATIN (LIPITOR) 40 MG TABLET    Take 1 tablet (40 mg total) by mouth once daily.    DAPAGLIFLOZIN PROPANEDIOL (FARXIGA) 5 MG TAB TABLET    Take 1 tablet (5 mg total) by mouth once daily.    GLIMEPIRIDE (AMARYL) 4 MG TABLET    Take 1 tablet (4 mg total) by mouth before breakfast.    LISINOPRIL (PRINIVIL,ZESTRIL) 20 MG TABLET    Take 1 tablet (20 mg total) by mouth once daily.    METFORMIN (GLUCOPHAGE) 1000 MG TABLET    Take 1 tablet (1,000 mg total) by mouth 2 (two) times daily.   Discontinued Medications    CALCIUM CARBONATE-VIT D3- MG CALCIUM- 400 UNIT TAB    Take 1 tablet by mouth once daily.    DULAGLUTIDE (TRULICITY) 3 MG/0.5 ML PEN INJECTOR    Inject 3 mg into the skin every 7 days.

## 2025-04-07 LAB
LEFT EYE DM RETINOPATHY: POSITIVE
RIGHT EYE DM RETINOPATHY: POSITIVE

## 2025-04-08 ENCOUNTER — PATIENT OUTREACH (OUTPATIENT)
Dept: ADMINISTRATIVE | Facility: HOSPITAL | Age: 75
End: 2025-04-08
Payer: MEDICARE